# Patient Record
Sex: MALE | Race: WHITE | NOT HISPANIC OR LATINO | Employment: FULL TIME | URBAN - METROPOLITAN AREA
[De-identification: names, ages, dates, MRNs, and addresses within clinical notes are randomized per-mention and may not be internally consistent; named-entity substitution may affect disease eponyms.]

---

## 2020-09-03 ENCOUNTER — TREATMENT (OUTPATIENT)
Dept: PHYSICAL THERAPY | Facility: CLINIC | Age: 60
End: 2020-09-03

## 2020-09-03 DIAGNOSIS — Z74.09 IMPAIRED MOBILITY AND ADLS: Primary | ICD-10-CM

## 2020-09-03 DIAGNOSIS — R27.8 DECREASED COORDINATION: ICD-10-CM

## 2020-09-03 DIAGNOSIS — Z78.9 IMPAIRED MOBILITY AND ADLS: Primary | ICD-10-CM

## 2020-09-03 PROCEDURE — 97530 THERAPEUTIC ACTIVITIES: CPT | Performed by: OCCUPATIONAL THERAPIST

## 2020-09-03 PROCEDURE — 97166 OT EVAL MOD COMPLEX 45 MIN: CPT | Performed by: OCCUPATIONAL THERAPIST

## 2020-09-03 NOTE — PROGRESS NOTES
Occupational Therapy Initial Evaluation and Plan of Care      Patient: Maury Mei   : 1960  Diagnosis/ICD-10 Code:  Impaired mobility and ADLs [Z74.09, Z78.9]  Referring practitioner: No Known Provider  Date of Initial Visit: Type: THERAPY  Noted: 9/3/2020  Today's Date: 2020  Patient seen for 1 sessions      Subjective:     Subjective Questionnaire: 9HPT R: 25.75 L: 30.76  PURDUE PEGBOARD R: 13 L: 10 ROW: 8 ASSEMBLY: 21  Pt states performing washer with left hand was the most difficult item to manipulate. Pt demonstrates decreased translation and very deliberate movement L hand.       Subjective Evaluation    History of Present Illness  Mechanism of injury: Pt was at home, went into his office and experienced a sudden onset of inability to read emails, inability to speak, & L sided facial drooping. Pt also with L sided numbness and tingling. Wife took pt to [facility] and was then transferred to . At  pt received tPA and determined officially to have had a CVA. Pt was admitted on the  and was relased on the 26th of Aug to home with recommendations for OP therapy.   Pt reports that he falls to L when tying shoelaces along with when ambulating, he veers to left.  Pt has returned to cooking, mowing the lawn and cleaning with minimal difficulty. Pt states he has increased difficulty with concentration during reading but feels this is due to needing eyes examined.   When standing in the shower, pt experiences LOB, dick when closing eyes. He has noted significant increased fatigue and reports not feeling safe when performing stairs.      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    Hand dominance: right    Patient  Goals  Patient goals for therapy: return to sport/leisure activities, return to work, increased motion and improved balance  Patient goal: increase FMC speed and accuracy          Objective          Active Range of Motion   Left Shoulder   Flexion: WFL  Abduction: WFL    Right Shoulder   Flexion: WFL  Abduction: WFL    Left Elbow   Flexion: WFL  Extension: WFL  Forearm supination: WFL  Forearm pronation: WFL    Right Elbow   Flexion: WFL  Extension: WFL  Forearm supination: WFL  Forearm pronation: WFL    Strength/Myotome Testing     Left Shoulder     Planes of Motion   Flexion: 5   Abduction: 5     Right Shoulder     Planes of Motion   Flexion: 5   Abduction: 5     Left Elbow   Flexion: 5  Extension: 5    Right Elbow   Flexion: 5  Extension: 5    Left Wrist/Hand   Wrist extension: 5  Wrist flexion: 5     (2nd hand position)     Trial 1: 85 lbs    Trial 2: 85 lbs    Trial 3: 85 lbs    Average: 85 lbs    Thumb Strength  Key/Lateral Pinch     Trial 1: 20 lbs    Trial 2: 22 lbs    Trial 3: 24 lbs    Average: 22 lbs    Right Wrist/Hand   Wrist extension: 5  Wrist flexion: 5     (2nd hand position)     Trial 1: 90 lbs    Trial 2: 90 lbs    Trial 3: 85 lbs    Average: 88.33 lbs    Thumb Strength   Key/Lateral Pinch     Trial 1: 26 lbs    Trial 2: 22 lbs    Trial 3: 24 lbs    Average: 24 lbs    Additional Strength Details  Opposition slightly off L hand vs R  Decreased finger to nose, eyes closed with L targeting         OT Neuro         OT Exercises     Row Name 09/03/20 1300             Precautions    Existing Precautions/Restrictions  fall  -ST         Exercise 1    Exercise Name 1  OT IE completed per consult   -ST         Exercise 2    Exercise Name 2  issued green resistance putty, issued written HEP w/focus on pincer and lateral grasps  -ST         Exercise 3    Exercise Name 3  FMC w/focus on translation and in-hand manipulation L hand using nut/bolt  -ST        User Key  (r) = Recorded By, (t) = Taken By,  (c) = Cosigned By    Initials Name Provider Type     Margie Velvet J, OTR Occupational Therapist           Assessment & Plan     Assessment  Impairments: abnormal coordination, activity intolerance, impaired balance, impaired physical strength and lacks appropriate home exercise program  Assessment details: Pt presents with deficits related to acute CVA affecting L coordination and balance. Pt demonstrates mild GM and FMC impairments, particularly w/translation and in-hand manipulation. Recommend skilled OT services to address above areas to improve safety, independence and engagement in daily tasks.   Prognosis: fair  Functional Limitations: carrying objects, lifting, walking, pulling, pushing, standing, stooping, reaching overhead and unable to perform repetitive tasks  Goals  Plan Goals:   Pt will score 26 seconds or less L HAND on the 9HPT to demonstrate improved accuracy and speed with fine motor coordination by 8 wks.        Pt will complete dynamic standing activity X 10 mins with independent support while incorporating BUE's to simulate ADL/IADL tasks by 8 wks.     Pt will be independent with L hand strengthening HEP to increase independence with ADL/IADL performance tasks by 4 wks.    Pt will be independent with L hand FMC HEP to increase independence with ADL/IADL performance tasks by 4 wks.         Plan  Planned therapy interventions: ADL retraining, balance/weight-bearing training, fine motor coordination training, flexibility, functional ROM exercises, home exercise program, IADL retraining, motor coordination training, neuromuscular re-education, postural training, strengthening, stretching, therapeutic activities and transfer training  Frequency: 1x week  Duration in weeks: 8  Plan details: Est. OT POC and goals to reflect above deficits and promote increased independence, safety and engagement in daily tasks.         Timed:  Manual Therapy:    0     mins  88311;  Therapeutic Exercise:    0      mins  32262;     Neuromuscular Diomedes:    0    mins  69880;    Therapeutic Activity:     12     mins  12139;     Self-Care/ADL     0     mins  18860;   Sensory Int. Tech      0     mins 14387;  Ultrasound:     0     mins  03551;    Electrical Stimulation:    0     mins  99022 ( );    Untimed:  Electrical Stimulation:    0     mins  27767 ( );    Timed Treatment:   12   mins   Total Treatment:     45   mins    OT Signature: Velvet Hanson MS, OTR/L, CDP  [place] License #: [id number]  DATE TREATMENT INITIATED: 9/4/2020    Initial Certification Certification Period: 12/3/2020  I certify that the therapy services are furnished while this patient is under my care. The services outlined above are required by this patient and will be reviewed every 90 days.     Physician Signature: __________________________________  Provider, No Known    Please sign and return via fax to [phone]  Thank you,   [facility] Occupational Therapy

## 2020-09-09 ENCOUNTER — TREATMENT (OUTPATIENT)
Dept: PHYSICAL THERAPY | Facility: CLINIC | Age: 60
End: 2020-09-09

## 2020-09-09 DIAGNOSIS — Z74.09 IMPAIRED FUNCTIONAL MOBILITY, BALANCE, GAIT, AND ENDURANCE: Primary | ICD-10-CM

## 2020-09-09 DIAGNOSIS — I63.9 CEREBROVASCULAR ACCIDENT (CVA), UNSPECIFIED MECHANISM (HCC): ICD-10-CM

## 2020-09-09 PROCEDURE — 97162 PT EVAL MOD COMPLEX 30 MIN: CPT | Performed by: PHYSICAL THERAPIST

## 2020-09-09 NOTE — PROGRESS NOTES
Physical Therapy Initial Evaluation and Plan of Care      Patient: Maury Mei   : 1960  Diagnosis/ICD-10 Code:  Impaired functional mobility, balance, gait, and endurance [Z74.09]  Referring practitioner: No Known Provider  Date of Initial Visit: 2020  Today's Date: 2020  Patient seen for 1 sessions      Subjective:     Subjective Questionnaire: FGA   TUG 9.63 sec   10 Meter 10.06sec   miniBESTest       Subjective Evaluation    History of Present Illness  Date of onset: 2020  Mechanism of injury: Pt was at home, went into his office and experienced a sudden onset of inability to read emails, inability to speak, & L sided facial drooping. Pt also with L sided numbness and tingling. Wife took pt to [facility] and was then transferred to . At  pt received tPA and determined officially to have had a CVA. Pt was admitted on the  and was relased on the 26th of Aug to home with recommendations for OP therapy.   Pt reports that he falls to L when tying shoelaces along with when ambulating, he veers to left. 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 He is on FMLA currently.   Pt has returned to cooking, mowing the lawn and cleaning with minimal difficulty. Pt states he has increased difficulty with concentration during reading but feels this is due to needing eyes examined.   When standing in the shower, pt experiences LOB, dick when closing eyes. He has noted significant increased fatigue and reports not feeling safe when performing stairs.   Dr. Crow caro/  in [place]  [facility] will be at        Patient Occupation: Currently on FMLA - [employer]  Quality of life: good    Pain  No pain reported    Social Support  Lives in: multiple-level home  Lives with: spouse             Objective          Strength/Myotome Testing     Left Hip   Planes of Motion   Flexion: 4+  Extension: 3-  Abduction: 4-    Right Hip   Planes of Motion   Flexion: 5  Extension:  4  Abduction: 4+    Left Knee   Flexion: 4+  Extension: 4+    Right Knee   Flexion: 5  Extension: 5    Left Ankle/Foot   Dorsiflexion: 5    Right Ankle/Foot   Dorsiflexion: 5    Ambulation     Observational Gait     Additional Observational Gait Details  Normalized gait, slowed L arm swing         PT Neuro         Assessment & Plan     Assessment  Impairments: abnormal coordination, abnormal gait, activity intolerance, impaired balance, impaired physical strength and safety issue  Assessment details: Patient is a 60 YOM that presents with balance, strength and coordination deficits following a CVA. He has strength loss on the L that is impacting his balance in sitting and standing. He is currently off of work and plans to return to work in about 2 months. Patient will require skilled PT intervention to address deficits in order to improve function and mobility.   Prognosis: fair  Functional Limitations: carrying objects, walking, standing and stooping  Goals  Plan Goals: STG (6 visits)  1. Patient will report compliance with initial HEP.   2. Patient to perform TUG within 9 sec without LOB for improved functional mobility.  3. Patient to ambulate 10 meters without AD within 9 sec without LOB for improved       gait renard and functional mobility.  4. Patient to improve FGA score to >/= 27 /30 to decrease client's risk of falls.  5. Patient to improve mini-BEST test balance score to >/= 25/28 to decrease risk of falls.    LTG (12 visits)  1. Patient will be I with final HEP.   2. Patient to perform TUG within 8 sec without LOB for improved functional mobility.  3. Patient to ambulate 10 meters without AD within 8 sec without LOB for improved gait renard and functional mobility.  4. Patient to improve FGA score to >/= 30/30 to decrease client's risk of falls.  5. Patient to improve mini-BEST test balance score to >/= 28/28 to decrease client's risk of falls.        Plan  Therapy options: will be seen for skilled  physical therapy services  Planned modality interventions: TENS  Planned therapy interventions: ADL retraining, balance/weight-bearing training, flexibility, gait training, manual therapy, neuromuscular re-education, motor coordination training, postural training, strengthening, stretching, therapeutic activities, transfer training and home exercise program  Frequency: 1x week  Duration in visits: 6  Treatment plan discussed with: patient and caregiver  Plan details: Patient will be seen 1x/wk x 6wks with treatment to include strengthening, stretching, manual therapy, neuromuscular re-education, balance, gait and endurance training.           Timed:  Manual Therapy:    0     mins  78481;  Therapeutic Exercise:    0     mins  31365;     Neuromuscular Diomedes:    0    mins  13436;    Therapeutic Activity:     0     mins  15391;     Gait Trainin     mins  35017;     Electrical Stimulation:    0     mins  85937 ( );    Untimed:  Canalith Repositioning    0     mins 64779    Timed Treatment:   0   mins   Total Treatment:     45   mins    PT SIGNATURE: Pauly Huff PT, DPT, MSCS, CDP  [place] License #[id number]  DATE TREATMENT INITIATED: 2020    Initial Certification Certification Period: 2020  I certify that the therapy services are furnished while this patient is under my care.  The services outlined above are required by this patient, and will be reviewed every 90 days.     PHYSICIAN: Provider, No Known      DATE:     Please sign and return via fax to [phone].   Thank you,   [facility] Physical Therapy.

## 2020-09-10 ENCOUNTER — TREATMENT (OUTPATIENT)
Dept: PHYSICAL THERAPY | Facility: CLINIC | Age: 60
End: 2020-09-10

## 2020-09-10 DIAGNOSIS — Z74.09 IMPAIRED MOBILITY AND ADLS: Primary | ICD-10-CM

## 2020-09-10 DIAGNOSIS — R27.8 DECREASED COORDINATION: ICD-10-CM

## 2020-09-10 DIAGNOSIS — Z78.9 IMPAIRED MOBILITY AND ADLS: Primary | ICD-10-CM

## 2020-09-10 PROCEDURE — 97530 THERAPEUTIC ACTIVITIES: CPT | Performed by: OCCUPATIONAL THERAPIST

## 2020-09-10 PROCEDURE — 97112 NEUROMUSCULAR REEDUCATION: CPT | Performed by: OCCUPATIONAL THERAPIST

## 2020-09-10 NOTE — PROGRESS NOTES
"Occupational Therapy Daily Progress Note  Visit: 2  Date of Initial Visit: Type: THERAPY  Noted: 9/3/2020      Patient: Maury Mei   : 1960  Diagnosis/ICD-10 Code:  Impaired mobility and ADLs [Z74.09, Z78.9]  Referring practitioner: No Known Provider  Date of Initial Visit: Type: THERAPY  Noted: 9/3/2020  Today's Date: 9/10/2020  Patient seen for 2 sessions      Subjective:   Patient reports: \"I think my hand is getting better\"  Pain: 0/10  Subjective Questionnaire: N/A  Clinical Progress: improved  Home Program Compliance: Yes  Treatment has included: neuromuscular re-education and therapeutic activity    Subjective   Objective     OT Neuro         OT Exercises     Row Name 09/10/20 0845             Exercise 1    Exercise Name 1  digit strengthening  -ST      Equipment 1  Hand Gripper  -ST      Resistance 1  Blue;Black  -ST      Sets 1  4  -ST      Reps 1  10  -ST         Exercise 2    Exercise Name 2  translation and and pincer grasp focus using coins to retrieve and place on table top L hand, increasing speed for difficulty  -ST         Exercise 3    Exercise Name 3  FMC with translation/in-hand manipulation focus using flexi-puzzle  -ST         Exercise 4    Exercise Name 4  extensor strengthening entire hand/individual digits using rubber band resistance; see HEPs for details  -ST      Sets 4  2  -ST      Reps 4  10  -ST         Exercise 5    Exercise Name 5  L handed buttoning for improving speed, accuracy and dexterity   -ST         Exercise 6    Exercise Name 6  strengthening and coordination using L hand w/olive grabber to retreive varying sized/shaped objects, isolating individual digits   -ST         Exercise 7    Exercise Name 7  dyanmic tripod grasp L hand with tweezer prehension   -ST        User Key  (r) = Recorded By, (t) = Taken By, (c) = Cosigned By    Initials Name Provider Type    Velvet Velasco, OTR Occupational Therapist           Assessment & Plan     Assessment  Assessment " details: Pt demonstrates good progression with all L handed coordination tasks though fatigues quickly, dick with digits II and III w/any sustained/repetitive tasks. Pt with better in-hand manipulation vs translation. Pt is very motivated and compliant with HEPs. Added additional HEPs for home to progress complexity and difficulty of coordination/dexterity tasks to aid with return to work and PLOF.    Plan  Plan details: Continue w/POC and goals as est with progression of complexity to aid with return to PLOF.        Visit Diagnoses:    ICD-10-CM ICD-9-CM   1. Impaired mobility and ADLs Z74.09 V49.89    Z78.9    2. Decreased coordination R27.9 781.3             Timed:  Manual Therapy:    0     mins  93190;  Therapeutic Exercise:    0     mins  19898;     Neuromuscular Diomedes:    23    mins  47161;    Therapeutic Activity:     19     mins  67231;     Self-Care/ADL     0     mins  33041;   Sensory Int. Tech      0     mins 83838;  Ultrasound:     0     mins  89115;    Electrical Stimulation:    0     mins  40566 ( );    Untimed:  Electrical Stimulation:    0     mins  80261 ( );    Timed Treatment:   42   mins   Total Treatment:     42   mins    OT Signature: Velvet Hanson MS, OTR/L, CDP  [place] License #: [id number]

## 2020-09-14 ENCOUNTER — TREATMENT (OUTPATIENT)
Dept: PHYSICAL THERAPY | Facility: CLINIC | Age: 60
End: 2020-09-14

## 2020-09-14 DIAGNOSIS — Z74.09 IMPAIRED FUNCTIONAL MOBILITY, BALANCE, GAIT, AND ENDURANCE: Primary | ICD-10-CM

## 2020-09-14 DIAGNOSIS — I63.9 CEREBROVASCULAR ACCIDENT (CVA), UNSPECIFIED MECHANISM (HCC): ICD-10-CM

## 2020-09-14 PROCEDURE — 97112 NEUROMUSCULAR REEDUCATION: CPT | Performed by: PHYSICAL THERAPIST

## 2020-09-14 PROCEDURE — 97110 THERAPEUTIC EXERCISES: CPT | Performed by: PHYSICAL THERAPIST

## 2020-09-15 NOTE — PROGRESS NOTES
Physical Therapy Daily Progress Note  Visit: 2  Date of Initial Visit: Type: THERAPY  Noted: 2020    Patient: Maury Mei   : 1960  Diagnosis/ICD-10 Code:  Impaired functional mobility, balance, gait, and endurance [Z74.09]  Referring practitioner: No Known Provider  Date of Initial Visit: Type: THERAPY  Noted: 2020  Today's Date: 9/15/2020  Patient seen for 2 sessions      Subjective:   Patient reports: he cannot do 2 things at once.   Pain: 0/10  Clinical Progress: improved  Home Program Compliance: Yes  Treatment has included: therapeutic exercise and neuromuscular re-education    Objective   See Exercise, Manual, and Modality Logs for complete treatment.    PT Neuro  Exercise 1  Exercise Name 1: Nu-Step   Equipment/Resistance 1: L8  Time: 8 min  Exercise 2  Exercise Name 2: BOSU lunging   Sets/Reps 2: 20  Exercise 3  Exercise Name 3: BOSU standing with lateral, forward and backward stepping off of ball   Sets/Reps 3: 20 ea   Exercise 4  Exercise Name 4: BOSU airex balance beam walking - lateral, tandem and backwards   Exercise 5  Exercise Name 5: stair training forward and backwards     Assessment & Plan     Assessment  Assessment details: Patient demonstrates good balance on all unstable surfaces. He did report feeling a difference between R and L LE's with L being the worst. Patient will continue to be progressed with his HEP and exercises in clinic as indicated.     Plan  Plan details: Patient to continue with PT services to improve gait, balance, strength, transfers and overall functional mobility.          Timed:  Manual Therapy:            0     mins  43636;  Therapeutic Exercise:    10    mins  13423;     Neuromuscular Diomedes:    30    mins  94442;    Therapeutic Activity:      0     mins  75299;     Gait Trainin    mins  70139;     Electrical Stimulation:    0    mins  64529 ( );     Untimed:  Canalith Repositioning techniques _0_ 80091      Timed Treatment:    40   mins   Total Treatment:     40   mins      Pauly Huff PT, DPT, MSCS, CDP  [place] License #: [id number]  Physical Therapist

## 2020-09-21 ENCOUNTER — TREATMENT (OUTPATIENT)
Dept: PHYSICAL THERAPY | Facility: CLINIC | Age: 60
End: 2020-09-21

## 2020-09-21 DIAGNOSIS — I63.9 CEREBROVASCULAR ACCIDENT (CVA), UNSPECIFIED MECHANISM (HCC): ICD-10-CM

## 2020-09-21 DIAGNOSIS — Z74.09 IMPAIRED FUNCTIONAL MOBILITY, BALANCE, GAIT, AND ENDURANCE: Primary | ICD-10-CM

## 2020-09-21 PROCEDURE — 97112 NEUROMUSCULAR REEDUCATION: CPT | Performed by: PHYSICAL THERAPIST

## 2020-09-21 PROCEDURE — 97110 THERAPEUTIC EXERCISES: CPT | Performed by: PHYSICAL THERAPIST

## 2020-09-22 ENCOUNTER — TREATMENT (OUTPATIENT)
Dept: PHYSICAL THERAPY | Facility: CLINIC | Age: 60
End: 2020-09-22

## 2020-09-22 DIAGNOSIS — R27.8 DECREASED COORDINATION: ICD-10-CM

## 2020-09-22 DIAGNOSIS — Z78.9 IMPAIRED MOBILITY AND ADLS: Primary | ICD-10-CM

## 2020-09-22 DIAGNOSIS — Z74.09 IMPAIRED MOBILITY AND ADLS: Primary | ICD-10-CM

## 2020-09-22 PROCEDURE — 97530 THERAPEUTIC ACTIVITIES: CPT | Performed by: OCCUPATIONAL THERAPIST

## 2020-09-22 PROCEDURE — 97110 THERAPEUTIC EXERCISES: CPT | Performed by: OCCUPATIONAL THERAPIST

## 2020-09-22 NOTE — PROGRESS NOTES
Physical Therapy Daily Progress Note  Visit: 3  Date of Initial Visit: Type: THERAPY  Noted: 2020    Patient: Maury Mei   : 1960  Diagnosis/ICD-10 Code:  Impaired functional mobility, balance, gait, and endurance [Z74.09]  Referring practitioner: No Known Provider  Date of Initial Visit: Type: THERAPY  Noted: 2020  Today's Date: 2020  Patient seen for 3 sessions      Subjective:   Patient reports: he is doing better.   Pain: 0/10  Clinical Progress: improved  Home Program Compliance: Yes  Treatment has included: therapeutic exercise and neuromuscular re-education    Objective   See Exercise, Manual, and Modality Logs for complete treatment.    PT Neuro   Exercise 1  Exercise Name 1: Nu-Step   Equipment/Resistance 1: L8  Time: 8 min  Exercise 2  Exercise Name 2: BOSU step backs   Sets/Reps 2: 20  Exercise 3  Exercise Name 3: airex SLS with rebounder toss   Sets/Reps 3: 10  Exercise 4  Exercise Name 4: lateral SLS toss   Sets/Reps 4: 20  Exercise 5  Exercise Name 5: rebounder outward and switch kick jumps   Sets/Reps 5: 10 ea   Exercise 6  Exercise Name 6: SLS tennis ball toss to wall   Sets/Reps 6: 20   Exercise 7  Exercise Name 7: SLS taps to wall   Sets/Reps 7: 5 rounds   Exercise 8  Exercise Name 8: jumping over stable ground   Time 8: 2 min   Exercise 9  Exercise Name 9: backwards lunges   Sets/Reps 9: 5    Assessment & Plan     Assessment  Assessment details: Patient demonstrates excellent dynamic and static balance with dual tasking. He is improving with BLE strength and had no complaints during exercise. HEP will be progressed.     Plan  Plan details: Patient to continue with PT services to improve gait, balance, strength, transfers and overall functional mobility.          Timed:  Manual Therapy:            0     mins  09103;  Therapeutic Exercise:    20    mins  66840;     Neuromuscular Diomedes:    25    mins  27658;    Therapeutic Activity:      0     mins  09010;     Gait Training:                  0    mins  37163;     Electrical Stimulation:    0    mins  94429 ( );     Untimed:  Canalith Repositioning techniques _0_ 75321      Timed Treatment:   45   mins   Total Treatment:     45   mins      Pauly Huff PT, REAT, MSCS, CDP  [place] License #: [id number]  Physical Therapist

## 2020-09-22 NOTE — PROGRESS NOTES
"Occupational Therapy Daily Progress Note  Visit: 3  Date of Initial Visit: Type: THERAPY  Noted: 9/3/2020      Patient: Maury Mei   : 1960  Diagnosis/ICD-10 Code:  Impaired mobility and ADLs [Z74.09, Z78.9]  Referring practitioner: No Known Provider  Date of Initial Visit: Type: THERAPY  Noted: 9/3/2020  Today's Date: 2020  Patient seen for 3 sessions      Subjective:   Patient reports: \"I've been working on yard work and even cooking pretty much every day now. The big thing that gets me is the fatigue level\"  Pain: 0/10  Subjective Questionnaire: n/a  Clinical Progress: improved  Home Program Compliance: Yes  Treatment has included: therapeutic exercise and therapeutic activity    Subjective   Objective     OT Neuro         OT Exercises     Row Name 20 1300             Exercise 1    Exercise Name 1  neural priming with UE strengthening, NuStep, UE's only, L7  -ST      Time (Minutes) 1  4  -ST         Exercise 2    Exercise Name 2  digit strengthening  -ST      Equipment 2  Hand Gripper  -ST      Resistance 2  Blue;Black  -ST      Sets 2  2  -ST      Reps 2  10  -ST         Exercise 3    Exercise Name 3  finger tip shoulder f/e and shoulder abd/adduction using 4# med ball finger tips to maintain balance and stability of ball   -ST      Sets 3  2  -ST      Reps 3  10  -ST         Exercise 4    Exercise Name 4  bicep curls and OH press using 4# med ball   -ST      Sets 4  2  -ST      Reps 4  10  -ST         Exercise 5    Exercise Name 5  5# therabar OH press/CP, wrist f/e  -ST      Sets 5  2  -ST      Reps 5  10  -ST         Exercise 6    Exercise Name 6  forearm pronation/supination, wrist f/e using blue flexi-bar  -ST      Sets 6  2  -ST      Reps 6  10  -ST         Exercise 7    Exercise Name 7  FMC with focus on speed, accuracy and control of B hands and individual hands w/shuffling cards  -ST         Exercise 8    Exercise Name 8  translation/in-hand manipulation L hand using coins for " pincer grasp   -ST        User Key  (r) = Recorded By, (t) = Taken By, (c) = Cosigned By    Initials Name Provider Type    Velvet Velasco OTR Occupational Therapist           Assessment & Plan     Assessment  Assessment details: Pt improving with FMC speed and accuracy L hand but continues to experience fatigue and weakness with LUE during any sustained/repetivie motions. Pt notes weakness mostly in bicep and deltoid regions. Pt and wife educated at length on taking adequate rest breaks at home and spreading out activities to build endurance where lost from CVA.     Plan  Plan details: Continue w/OT POC and goals as previously est.         Visit Diagnoses:    ICD-10-CM ICD-9-CM   1. Impaired mobility and ADLs  Z74.09 V49.89    Z78.9    2. Decreased coordination  R27.9 781.3             Timed:  Manual Therapy:    0     mins  28679;  Therapeutic Exercise:    20     mins  91279;     Neuromuscular Diomedes:    0    mins  01844;    Therapeutic Activity:     25     mins  53576;     Self-Care/ADL     0     mins  54818;   Sensory Int. Tech      0     mins 28918;  Ultrasound:     0     mins  55698;    Electrical Stimulation:    0     mins  55056 ( );    Untimed:  Electrical Stimulation:    0     mins  30892 ( );    Timed Treatment:   45   mins   Total Treatment:     45   mins    OT Signature: Velvet Hanson MS, OTR/L, LYLY  [place] License #: [id number]

## 2020-09-23 ENCOUNTER — TRANSCRIBE ORDERS (OUTPATIENT)
Dept: PHYSICAL THERAPY | Facility: CLINIC | Age: 60
End: 2020-09-23

## 2020-09-29 ENCOUNTER — TREATMENT (OUTPATIENT)
Dept: PHYSICAL THERAPY | Facility: CLINIC | Age: 60
End: 2020-09-29

## 2020-09-29 DIAGNOSIS — Z74.09 IMPAIRED MOBILITY AND ADLS: Primary | ICD-10-CM

## 2020-09-29 DIAGNOSIS — R27.8 DECREASED COORDINATION: ICD-10-CM

## 2020-09-29 DIAGNOSIS — Z78.9 IMPAIRED MOBILITY AND ADLS: Primary | ICD-10-CM

## 2020-09-29 PROCEDURE — 97110 THERAPEUTIC EXERCISES: CPT | Performed by: OCCUPATIONAL THERAPIST

## 2020-09-29 PROCEDURE — 97112 NEUROMUSCULAR REEDUCATION: CPT | Performed by: OCCUPATIONAL THERAPIST

## 2020-09-29 PROCEDURE — 97530 THERAPEUTIC ACTIVITIES: CPT | Performed by: OCCUPATIONAL THERAPIST

## 2020-09-29 NOTE — PROGRESS NOTES
"Occupational Therapy Daily Progress Note  Visit: 4  Date of Initial Visit: Type: THERAPY  Noted: 9/3/2020      Patient: Maury Mei   : 1960  Diagnosis/ICD-10 Code:  Impaired mobility and ADLs [Z74.09, Z78.9]  Referring practitioner: No Known Provider  Date of Initial Visit: Type: THERAPY  Noted: 9/3/2020  Today's Date: 2020  Patient seen for 4 sessions      Subjective:   Patient reports: \"I'm working on building my deck. It's a lot harder than I thought. I just don'w have the stamina I used to have. Stirring the concrete is the worst part\"  Pain: 0/10  Subjective Questionnaire: n/a  Clinical Progress: improved  Home Program Compliance: Yes  Treatment has included: therapeutic exercise, neuromuscular re-education and therapeutic activity    Subjective   Objective     OT Neuro              Assessment & Plan     Assessment  Assessment details: Pt fatiguing quickly with lunging ALT R/L LE's onto Bosu with OH strengthening activities s/p use of NuStep. Pt improving with reaction time and WS'ing with cuing during standing on Bosu and 1/2 foam rolls. Pt continues to be slower and require significantly more time and effort w/high-level L handed dexterity tasks.         Visit Diagnoses:    ICD-10-CM ICD-9-CM   1. Impaired mobility and ADLs  Z74.09 V49.89    Z78.9    2. Decreased coordination  R27.9 781.3       SEE ACTIVITY LOG AND EXERCISE FLOW SHEET FOR DETAILS        Timed:  Manual Therapy:    0     mins  67520;  Therapeutic Exercise:    15     mins  33552;     Neuromuscular Diomedes:    15    mins  33002;    Therapeutic Activity:     15     mins  80053;     Self-Care/ADL     0     mins  02949;   Sensory Int. Tech      0     mins 01230;  Ultrasound:     0     mins  19385;    Electrical Stimulation:    0     mins  61512 ( );    Untimed:  Electrical Stimulation:    0     mins  90039 ( );    Timed Treatment:   45   mins   Total Treatment:     45   mins    OT Signature: Velvet Hanson MS, OTR/L, " LYLY  [place] License #: [id number]

## 2020-10-05 ENCOUNTER — TREATMENT (OUTPATIENT)
Dept: PHYSICAL THERAPY | Facility: CLINIC | Age: 60
End: 2020-10-05

## 2020-10-05 DIAGNOSIS — Z74.09 IMPAIRED FUNCTIONAL MOBILITY, BALANCE, GAIT, AND ENDURANCE: Primary | ICD-10-CM

## 2020-10-05 DIAGNOSIS — I63.9 CEREBROVASCULAR ACCIDENT (CVA), UNSPECIFIED MECHANISM (HCC): ICD-10-CM

## 2020-10-05 PROCEDURE — 97110 THERAPEUTIC EXERCISES: CPT | Performed by: PHYSICAL THERAPIST

## 2020-10-05 PROCEDURE — 97112 NEUROMUSCULAR REEDUCATION: CPT | Performed by: PHYSICAL THERAPIST

## 2020-10-06 ENCOUNTER — TREATMENT (OUTPATIENT)
Dept: PHYSICAL THERAPY | Facility: CLINIC | Age: 60
End: 2020-10-06

## 2020-10-06 DIAGNOSIS — Z74.09 IMPAIRED MOBILITY AND ADLS: Primary | ICD-10-CM

## 2020-10-06 DIAGNOSIS — Z78.9 IMPAIRED MOBILITY AND ADLS: Primary | ICD-10-CM

## 2020-10-06 DIAGNOSIS — R27.8 DECREASED COORDINATION: ICD-10-CM

## 2020-10-06 PROCEDURE — 97110 THERAPEUTIC EXERCISES: CPT | Performed by: OCCUPATIONAL THERAPIST

## 2020-10-06 PROCEDURE — 97112 NEUROMUSCULAR REEDUCATION: CPT | Performed by: OCCUPATIONAL THERAPIST

## 2020-10-06 NOTE — PROGRESS NOTES
Physical Therapy Daily Progress Note  Visit: 4  Date of Initial Visit: Type: THERAPY  Noted: 2020    Patient: Maury Mei   : 1960  Diagnosis/ICD-10 Code:  Impaired functional mobility, balance, gait, and endurance [Z74.09]  Referring practitioner: No Known Provider  Date of Initial Visit: Type: THERAPY  Noted: 2020  Today's Date: 10/6/2020  Patient seen for 4 sessions      Subjective:   Patient reports: he is quitting nicotine and caffeine all at once.    Pain: 0/10  Clinical Progress: improved  Home Program Compliance: Yes  Treatment has included: therapeutic exercise and neuromuscular re-education    Objective   See Exercise, Manual, and Modality Logs for complete treatment.    PT Neuro   Exercise 1  Exercise Name 1: Elliptical   Equipment/Resistance 1: L2  Time: 6 min   Exercise 2  Exercise Name 2: kneeling with UE assist   Sets/Reps 2: 10 ea   Exercise 3  Exercise Name 3: QP birddogs   Sets/Reps 3: 10 ea   Exercise 4  Exercise Name 4: QP donkey kicks   Sets/Reps 4: 10 ea   Exercise 5  Exercise Name 5: TG SLS   Sets/Reps 5: 15 ea   Exercise 6  Exercise Name 6: TG hops   Sets/Reps 6: 2/20   Exercise 7  Exercise Name 7: HK knee lifts   Sets/Reps 7: 10 ea   Exercise 8  Exercise Name 8: TK to HK   Sets/Reps 8: 20    Assessment & Plan     Assessment  Assessment details: Patient with increased weakness post elliptical. B quads were buckling. He required a 2 min rest break in order to continue with exercise. Donkey kicks were added to HEP. Overall he tolerated the treatment well with noted more fatigue.     Plan  Plan details: Patient to continue with PT services to improve gait, balance, strength, transfers and overall functional mobility.          Timed:  Manual Therapy:            0     mins  14299;  Therapeutic Exercise:    10    mins  77027;     Neuromuscular Diomedes:    30    mins  45586;    Therapeutic Activity:      0     mins  21545;     Gait Trainin    mins  07798;      Electrical Stimulation:    0    mins  99165 ( );     Untimed:  Canalith Repositioning techniques _0_ [id number]      Timed Treatment:   40   mins   Total Treatment:     40   mins      Pauly Huff PT, REAT, MSCS, CDP  [place] License #: [id number]  Physical Therapist

## 2020-10-06 NOTE — PROGRESS NOTES
"Occupational Therapy Daily Progress Note  Visit: 5  Date of Initial Visit: Type: THERAPY  Noted: 9/3/2020      Patient: Maury Mei   : 1960  Diagnosis/ICD-10 Code:  Impaired mobility and ADLs [Z74.09, Z78.9]  Referring practitioner: No Known Provider  Date of Initial Visit: Type: THERAPY  Noted: 9/3/2020  Today's Date: 10/6/2020  Patient seen for 5 sessions      Subjective:   Patient reports: \"I'm really actually sore from PT yesterday\"  Pain: 0/10  Subjective Questionnaire: n/a  Clinical Progress: improved  Home Program Compliance: Yes  Treatment has included: therapeutic exercise and neuromuscular re-education    Subjective   Objective     OT Neuro         OT Exercises     Row Name 10/06/20 1300             Exercise 1    Exercise Name 1  neural priming with UE/LE strengthening and activity stephan building, L 10  -ST      Time (Minutes) 1  8  -ST         Exercise 2    Exercise Name 2  ALT R/L BLE lunging on Bosu while performing elbow f/e using 4# med ball   -ST         Exercise 3    Exercise Name 3  lateral lunging onto Bosu ball; R/L LE's while performing trunk rotation R/L 4# med ball   -ST         Exercise 4    Exercise Name 4  stepping laterally R/L while performing CP   -ST      Equipment 4  Dowel  -ST      Weights/Plates 4  5  -ST         Exercise 5    Exercise Name 5  OH press/CP standing on Bosu  -ST      Equipment 5  Dowel  -ST      Weights/Plates 5  5  -ST         Exercise 6    Exercise Name 6  one knee tall kneeling/ALT R/L performing trunk rotation R/L lifting/placing 8# box  -ST         Exercise 7    Exercise Name 7  shoulder abd/adduction; OH press LUE  -ST      Cueing 7  Verbal  -ST      Equipment 7  Dumbell  -ST      Weights/Plates 7  5  -ST      Sets 7  2  -ST      Reps 7  10  -ST        User Key  (r) = Recorded By, (t) = Taken By, (c) = Cosigned By    Initials Name Provider Type    Velvet Velasco OTR Occupational Therapist           Assessment & Plan     Assessment  Assessment " details: Pt progressing with stamina and complexity of activities however still demonstrates weakness with OH and lateral UE movements as exhibited w/difficulty maintaining good elbow extension during varying exercises. Pt improving with dual tasks including balance/strengthening. Cuing for completion of correct plank position. Will continue to progress strength/balance/coordination for aid in return to PLOF.    Plan  Plan details: Continue w/OT POC and goals as previously est.         Visit Diagnoses:    ICD-10-CM ICD-9-CM   1. Impaired mobility and ADLs  Z74.09 V49.89    Z78.9    2. Decreased coordination  R27.8 781.3             Timed:  Manual Therapy:    0     mins  09444;  Therapeutic Exercise:    25     mins  85044;     Neuromuscular Diomedes:    20    mins  66248;    Therapeutic Activity:     0     mins  07488;     Self-Care/ADL     0     mins  64495;   Sensory Int. Tech      0     mins 46052;  Ultrasound:     0     mins  45351;    Electrical Stimulation:    0     mins  85274 ( );    Untimed:  Electrical Stimulation:    0     mins  14317 ( );    Timed Treatment:   45   mins   Total Treatment:     45   mins    OT Signature: Velvet Hanson MS, OTR/L, CDP  [place] License #: [id number]

## 2020-10-13 ENCOUNTER — TREATMENT (OUTPATIENT)
Dept: PHYSICAL THERAPY | Facility: CLINIC | Age: 60
End: 2020-10-13

## 2020-10-13 DIAGNOSIS — Z74.09 IMPAIRED MOBILITY AND ADLS: Primary | ICD-10-CM

## 2020-10-13 DIAGNOSIS — R27.8 DECREASED COORDINATION: ICD-10-CM

## 2020-10-13 DIAGNOSIS — Z78.9 IMPAIRED MOBILITY AND ADLS: Primary | ICD-10-CM

## 2020-10-13 PROCEDURE — 97110 THERAPEUTIC EXERCISES: CPT | Performed by: OCCUPATIONAL THERAPIST

## 2020-10-13 PROCEDURE — 97530 THERAPEUTIC ACTIVITIES: CPT | Performed by: OCCUPATIONAL THERAPIST

## 2020-10-14 ENCOUNTER — TREATMENT (OUTPATIENT)
Dept: PHYSICAL THERAPY | Facility: CLINIC | Age: 60
End: 2020-10-14

## 2020-10-14 DIAGNOSIS — Z74.09 IMPAIRED FUNCTIONAL MOBILITY, BALANCE, GAIT, AND ENDURANCE: Primary | ICD-10-CM

## 2020-10-14 DIAGNOSIS — I63.9 CEREBROVASCULAR ACCIDENT (CVA), UNSPECIFIED MECHANISM (HCC): ICD-10-CM

## 2020-10-14 PROCEDURE — 97110 THERAPEUTIC EXERCISES: CPT | Performed by: PHYSICAL THERAPIST

## 2020-10-14 PROCEDURE — 97112 NEUROMUSCULAR REEDUCATION: CPT | Performed by: PHYSICAL THERAPIST

## 2020-10-14 NOTE — PROGRESS NOTES
"OT Re-Assessment / Re-Certification        Patient: Maury Mei   : 1960  Diagnosis/ICD-10 Code:  Impaired mobility and ADLs [Z74.09, Z78.9]  Referring practitioner: No Known Provider  Date of Initial Visit: Type: THERAPY  Noted: 9/3/2020  Today's Date: 10/13/2020  Patient seen for 6 sessions      Subjective:   Patient reports: \"I think my balance, strength and coordination is really coming along. I even joined a gym near my house. The only thing that just really gets me is my endurance. It's just depressing. I tried walking to the bakery over the wkend and couldn't even get all the way there.\"  Pain: 0/10  Subjective Questionnaire: 9HPT R: 17.87 L: 20.51  PURDUE PEGBOARD R: 15 L; 14 ROW: 10 ASSEMBLY: 24  Pt demonstrates improved speed, accuracy and coordination with picking up, placing and manipulation of items in FMC tests  Clinical Progress: improved  Home Program Compliance: Yes  Treatment has included: therapeutic exercise and therapeutic activity    Subjective   Objective          Strength/Myotome Testing     Left Wrist/Hand      (2nd hand position)     Trial 1: 90 lbs    Trial 2: 91 lbs    Trial 3: 84 lbs    Average: 88.33 lbs    Thumb Strength  Key/Lateral Pinch     Trial 1: 21 lbs    Trial 2: 22 lbs    Trial 3: 25 lbs    Average: 22.67 lbs    Right Wrist/Hand      (2nd hand position)     Trial 1: 86 lbs    Trial 2: 87 lbs    Trial 3: 85 lbs    Average: 86 lbs    Thumb Strength   Key/Lateral Pinch     Trial 1: 25 lbs    Trial 2: 24 lbs    Trial 3: 24 lbs    Average: 24.33 lbs        OT Neuro        OT Exercises     Row Name 10/13/20 1300             Exercise 1    Exercise Name 1  neural priming with warm-up, strengthening and activity tolerance building; 4 mins L 10 NuStep, 2 mins elliptical L 2  -ST         Exercise 2    Exercise Name 2  OT re-assessment completed per POC  -ST         Exercise 3    Exercise Name 3  FMC task focus on translation and in-hand manipulation L hand w/eyes " closed to increase difficulty   -ST         Exercise 4    Exercise Name 4  4# med ball shoulder f/e, shoulder abd/adduction  -ST      Sets 4  2  -ST      Reps 4  12  -ST        User Key  (r) = Recorded By, (t) = Taken By, (c) = Cosigned By    Initials Name Provider Type    Velvet Velasco OTR Occupational Therapist          Assessment & Plan     Assessment  Impairments: abnormal coordination, activity intolerance, impaired balance, impaired physical strength and lacks appropriate home exercise program  Assessment details: OT re-assessment completed per POC. Pt met all existing OT goals and added specific UE strengthening HEP goal for carry over with return to work and other IADL tasks. Pt demonstrates great return with FMC and hand strength but continues to lack activity tolerance and generalized strength with will impact return to work. Recommend continued skilled OT services to aid with these areas and promote return to PLOF.     Prognosis: good  Functional Limitations: carrying objects, lifting, walking, pulling, pushing, standing, stooping, reaching overhead and unable to perform repetitive tasks  Goals  Plan Goals:   Pt will score 26 seconds or less L HAND on the 9HPT to demonstrate improved accuracy and speed with fine motor coordination by 8 wks. MET       Pt will complete dynamic standing activity X 10 mins with independent support while incorporating BUE's to simulate ADL/IADL tasks by 8 wks. MET     Pt will be independent with L hand strengthening HEP to increase independence with ADL/IADL performance tasks by 4 wks. MET    Pt will be independent with L hand FMC HEP to increase independence with ADL/IADL performance tasks by 4 wks. MET    Pt will be independent with LUE strengthening HEP to aid with return to work/IADL function by d/c. NEW GOAL ADDED 10/13/2020        Plan  Planned therapy interventions: ADL retraining, balance/weight-bearing training, fine motor coordination training, flexibility,  functional ROM exercises, home exercise program, IADL retraining, motor coordination training, neuromuscular re-education, postural training, strengthening, stretching, therapeutic activities and transfer training  Frequency: 1x week  Duration in weeks: 8  Plan details: Continue w/skilled OT POC and goals to reflect above deficits and promote increased independence, safety and engagement in daily tasks.         Visit Diagnoses:    ICD-10-CM ICD-9-CM   1. Impaired mobility and ADLs  Z74.09 V49.89    Z78.9    2. Decreased coordination  R27.8 781.3       Progress toward previous goals: Partially Met      Recommendations: Continue as planned  Timeframe: 1 month  Prognosis to achieve goals: good    OT Signature: Velvet Hanson MS, OTR/L, CDP  [place] License #: [id number]    Based upon review of the patient's progress and continued therapy plan, it is my medical opinion that Maury Mei should continue occupational therapy treatment at [facility]  [address] [place] [id number] [phone].    Signature: __________________________________  Provider, No Known    Timed:  Manual Therapy:    0     mins  69673;  Therapeutic Exercise:    16     mins  81817;     Neuromuscular Diomedes:    0    mins  38770;    Therapeutic Activity:     27     mins  84690;     Self-Care/ADL     0     mins  88277;   Sensory Int. Tech      0     mins 39561;  Ultrasound:     0     mins  05092;    Electrical Stimulation:    0     mins  94009 ( );    Untimed:  Electrical Stimulation:    0     mins  00291 ( );    Timed Treatment:   43   mins   Total Treatment:     43   mins

## 2020-10-15 NOTE — PROGRESS NOTES
PT Re-Assessment / Re-Certification  Visit: 5  Date of Initial Visit: Type: THERAPY  Noted: 2020    Patient: Maury Mei   : 1960  Diagnosis/ICD-10 Code:  Impaired functional mobility, balance, gait, and endurance [Z74.09]  Referring practitioner: No Known Provider  Date of Initial Visit: Type: THERAPY  Noted: 2020  Today's Date: 10/15/2020  Patient seen for 5 sessions      Subjective:   Patient reports: he is having a rough time since he is cutting caffiene and nicotine.   Pain: 0/10  Clinical Progress: improved  Home Program Compliance: Yes  Treatment has included: therapeutic exercise and neuromuscular re-education    Objective   See Exercise, Manual, and Modality Logs for complete treatment.    PT Neuro  Exercise 1  Exercise Name 1: Elliptical   Equipment/Resistance 1: L2  Time: 6 min   Exercise 2  Exercise Name 2: BOSU kneeling   Exercise 3  Exercise Name 3: BOSU sitting with feet off of floor - bringing knees up to chest.   Sets/Reps 3: 2/15  Exercise 4  Exercise Name 4: row  Time 4: 2 min - out of breath   Exercise 5  Exercise Name 5: Reassessment performed     Assessment & Plan     Assessment  Impairments: abnormal coordination, abnormal gait, activity intolerance, impaired balance, impaired physical strength and safety issue  Assessment details: Patient has made significant improvements since beginning skilled PT intervention, however patient has had a slight decline in the last 2 weeks due to cutting nicotine, as well as caffeine in order to improve diabetes and cut risk of another stroke. His endurance is the largest limiting factor to him returning to work or prior level of function. He will require continued skilled PT in order to address remaining deficits.   Prognosis: fair  Functional Limitations: carrying objects, walking, standing and stooping  Goals  Plan Goals: STG (6 visits)  1. Patient will report compliance with initial HEP. MET  2. Patient to perform TUG within 9 sec  without LOB for improved functional mobility.MET  3. Patient to ambulate 10 meters without AD within 9 sec without LOB for improved       gait renard and functional mobility. MET  4. Patient to improve FGA score to >/= 27 /30 to decrease client's risk of falls. MET  5. Patient to improve mini-BEST test balance score to >/= 25/28 to decrease risk of falls. ONGOING    LTG (12 visits)  1. Patient will be I with final HEP. ONGOING  2. Patient to perform TUG within 8 sec without LOB for improved functional mobility.ONGOING  3. Patient to ambulate 10 meters without AD within 8 sec without LOB for improved gait renard and functional mobility.ONGOING  4. Patient to improve FGA score to >/= 30/30 to decrease client's risk of falls.ONGOING  5. Patient to improve mini-BEST test balance score to >/= 28/28 to decrease client's risk of falls. ONGOING        Plan  Therapy options: will be seen for skilled physical therapy services  Planned modality interventions: TENS  Planned therapy interventions: ADL retraining, balance/weight-bearing training, flexibility, gait training, manual therapy, neuromuscular re-education, motor coordination training, postural training, strengthening, stretching, therapeutic activities, transfer training and home exercise program  Frequency: 1x week  Duration in visits: 4  Treatment plan discussed with: patient and caregiver  Plan details: Patient will be seen 1x/wk x 6wks with treatment to include strengthening, stretching, manual therapy, neuromuscular re-education, balance, gait and endurance training.           Visit Diagnoses:    ICD-10-CM ICD-9-CM   1. Impaired functional mobility, balance, gait, and endurance  Z74.09 V49.89   2. Cerebrovascular accident (CVA), unspecified mechanism (CMS/[facility])  I63.9 434.91       Progress toward previous goals: Partially Met      Recommendations: Continue as planned  Timeframe: 1 month    PT Signature: Pauly Huff, PT, DPT, MSCS, CDP  [place] License #:  [id number]    Based upon review of the patient's progress and continued therapy plan, it is my medical opinion that Maury Mei should continue physical therapy treatment at [facility]  [address] [place] [id number] [phone].    Signature: __________________________________  Provider, No Known    Timed:  Manual Therapy:    0     mins  38158;  Therapeutic Exercise:    20     mins  97840;     Neuromuscular Diomedes:    25    mins  47720;    Therapeutic Activity:     0     mins  53882;     Gait Trainin     mins  38805;     Electrical Stimulation:    0     mins  42569 ( );    Untimed:  Canalith Repositioning   0 mins  42995    Timed Treatment:   45   mins   Total Treatment:     45   mins

## 2020-10-19 ENCOUNTER — TREATMENT (OUTPATIENT)
Dept: PHYSICAL THERAPY | Facility: CLINIC | Age: 60
End: 2020-10-19

## 2020-10-19 DIAGNOSIS — Z74.09 IMPAIRED FUNCTIONAL MOBILITY, BALANCE, GAIT, AND ENDURANCE: Primary | ICD-10-CM

## 2020-10-19 DIAGNOSIS — I63.9 CEREBROVASCULAR ACCIDENT (CVA), UNSPECIFIED MECHANISM (HCC): ICD-10-CM

## 2020-10-19 PROCEDURE — 97110 THERAPEUTIC EXERCISES: CPT | Performed by: PHYSICAL THERAPIST

## 2020-10-19 PROCEDURE — 97112 NEUROMUSCULAR REEDUCATION: CPT | Performed by: PHYSICAL THERAPIST

## 2020-10-20 ENCOUNTER — TREATMENT (OUTPATIENT)
Dept: PHYSICAL THERAPY | Facility: CLINIC | Age: 60
End: 2020-10-20

## 2020-10-20 DIAGNOSIS — Z74.09 IMPAIRED MOBILITY AND ADLS: Primary | ICD-10-CM

## 2020-10-20 DIAGNOSIS — Z78.9 IMPAIRED MOBILITY AND ADLS: Primary | ICD-10-CM

## 2020-10-20 DIAGNOSIS — R27.8 DECREASED COORDINATION: ICD-10-CM

## 2020-10-20 PROCEDURE — 97530 THERAPEUTIC ACTIVITIES: CPT | Performed by: OCCUPATIONAL THERAPIST

## 2020-10-20 PROCEDURE — 97110 THERAPEUTIC EXERCISES: CPT | Performed by: OCCUPATIONAL THERAPIST

## 2020-10-20 NOTE — PROGRESS NOTES
"Occupational Therapy Daily Progress Note  Visit: 7  Date of Initial Visit: Type: THERAPY  Noted: 9/3/2020      Patient: Maury Mei   : 1960  Diagnosis/ICD-10 Code:  Impaired mobility and ADLs [Z74.09, Z78.9]  Referring practitioner: No Known Provider  Date of Initial Visit: Type: THERAPY  Noted: 9/3/2020  Today's Date: 10/20/2020  Patient seen for 7 sessions      Subjective:   Patient reports: \"I'm having a hard time getting my back pack blower on. I even have to have my wife help because my L shoulder just won't reach\"  Pain: 0/10  Subjective Questionnaire: n/a  Clinical Progress: improved  Home Program Compliance: Yes  Treatment has included: therapeutic exercise and therapeutic activity    Subjective   Objective     OT Neuro         OT Exercises     Row Name 10/20/20 1300             Exercise 1    Exercise Name 1  neural priming with warm-up, strengthening and activity tolerance building; 3 mins L 10 NuStep, 3 mins elliptical L 2  -ST      Time (Minutes) 1  6  -ST         Exercise 2    Exercise Name 2  calf and hamstring stretching using stretching strap; ALT R/L   -ST      Sets 2  2  -ST      Time (Seconds) 2  30  -ST         Exercise 3    Exercise Name 3  LUE loaded end strengthening using short tbar pronation/supination; wrist f/e  -ST      Equipment 3  Dowel  -ST      Weights/Plates 3  3  -ST      Sets 3  2  -ST      Reps 3  12  -ST         Exercise 4    Exercise Name 4  stepping laterally along foam balance beam performing OH press 7.5# kettle bell  -ST      Sets 4  2  -ST      Reps 4  15  -ST         Exercise 5    Exercise Name 5  stepping heel to toe along foam balance beam performing ALT CP/shoulder abd/adduction 7.5# kettle bell  -ST      Sets 5  2  -ST      Reps 5  12  -ST         Exercise 6    Exercise Name 6  OH arc using 5# therabar LUE for increasing distal stability and control for carry over with HM/IADL taks   -ST      Sets 6  2  -ST      Reps 6  10  -ST         Exercise 7    Exercise " Name 7  wall stretching, focus on scap retraction L   -ST      Sets 7  2  -ST      Reps 7  5  -ST      Time (Seconds) 7  15  -ST         Exercise 8    Exercise Name 8  scap depression  -ST      Sets 8  2  -ST      Reps 8  5  -ST         Exercise 9    Exercise Name 9  extension strenthening using rubber bands; see media tab for details   -ST      Sets 9  1  -ST      Reps 9  10  -ST        User Key  (r) = Recorded By, (t) = Taken By, (c) = Cosigned By    Initials Name Provider Type    Velvet Velasco OTR Occupational Therapist           Assessment & Plan     Assessment  Assessment details: Pt exhibits increased tightness and tension in trap L UE during rest but worse after sustained/repetitive exercises/tasks. Educated at length on relaxation and positioning along with depression exercises. Performed door stretches and issued for HEP along with increasing L digit strength through extensor strengthening isolated by resistive tasks.         Visit Diagnoses:    ICD-10-CM ICD-9-CM   1. Impaired mobility and ADLs  Z74.09 V49.89    Z78.9    2. Decreased coordination  R27.8 781.3             Timed:  Manual Therapy:    0     mins  72698;  Therapeutic Exercise:    30     mins  29285;     Neuromuscular Diomedes:    0    mins  09955;    Therapeutic Activity:     15     mins  90002;     Self-Care/ADL     0     mins  55290;   Sensory Int. Tech      0     mins 28818;  Ultrasound:     0     mins  26716;    Electrical Stimulation:    0     mins  16848 ( );    Untimed:  Electrical Stimulation:    0     mins  77090 ( );    Timed Treatment:   45   mins   Total Treatment:     45   mins    OT Signature: Velvet Hanson MS, OTR/L, LYLY  [place] License #: [id number]

## 2020-10-26 ENCOUNTER — TREATMENT (OUTPATIENT)
Dept: PHYSICAL THERAPY | Facility: CLINIC | Age: 60
End: 2020-10-26

## 2020-10-26 DIAGNOSIS — Z74.09 IMPAIRED FUNCTIONAL MOBILITY, BALANCE, GAIT, AND ENDURANCE: Primary | ICD-10-CM

## 2020-10-26 DIAGNOSIS — I63.9 CEREBROVASCULAR ACCIDENT (CVA), UNSPECIFIED MECHANISM (HCC): ICD-10-CM

## 2020-10-26 PROCEDURE — 97530 THERAPEUTIC ACTIVITIES: CPT | Performed by: PHYSICAL THERAPIST

## 2020-10-26 PROCEDURE — 97110 THERAPEUTIC EXERCISES: CPT | Performed by: PHYSICAL THERAPIST

## 2020-10-26 PROCEDURE — 97112 NEUROMUSCULAR REEDUCATION: CPT | Performed by: PHYSICAL THERAPIST

## 2020-10-26 NOTE — PROGRESS NOTES
Physical Therapy Daily Progress Note  Visit: 7  Date of Initial Visit: Type: THERAPY  Noted: 2020    Patient: Maury Mei   : 1960  Diagnosis/ICD-10 Code:  Impaired functional mobility, balance, gait, and endurance [Z74.09]  Referring practitioner: No Known Provider  Date of Initial Visit: Type: THERAPY  Noted: 2020  Today's Date: 10/26/2020  Patient seen for 7 sessions      Subjective:   Patient reports: he feels very stiff in the mornings.   Pain: 0/10  Clinical Progress: improved  Home Program Compliance: Yes  Treatment has included: therapeutic exercise, neuromuscular re-education and therapeutic activity    Objective   See Exercise, Manual, and Modality Logs for complete treatment.    PT Neuro          Assessment & Plan     Assessment  Assessment details: Patient continues with L sided weakness, particularly in the quads. He is initiating a flexibility program with his HEP to address tightness he is experiencing in the mornings and with exercise.     Plan  Plan details: Patient to continue with PT services to improve gait, balance, strength, transfers and overall functional mobility.          Timed:  Manual Therapy:            0     mins  89538;  Therapeutic Exercise:    18    mins  67605;     Neuromuscular Diomedes:     10   mins  31611;    Therapeutic Activity:      17     mins  61878;     Gait Trainin    mins  35950;     Electrical Stimulation:    0    mins  18013 ( );     Untimed:  Canalith Repositioning techniques _0_ [id number]      Timed Treatment:   45   mins   Total Treatment:     45   mins      Pauly Huff, PT, DPT, MSCS, CDP  [place] License #: [id number]  Physical Therapist

## 2020-11-02 ENCOUNTER — TREATMENT (OUTPATIENT)
Dept: PHYSICAL THERAPY | Facility: CLINIC | Age: 60
End: 2020-11-02

## 2020-11-02 DIAGNOSIS — Z74.09 IMPAIRED FUNCTIONAL MOBILITY, BALANCE, GAIT, AND ENDURANCE: Primary | ICD-10-CM

## 2020-11-02 DIAGNOSIS — I63.9 CEREBROVASCULAR ACCIDENT (CVA), UNSPECIFIED MECHANISM (HCC): ICD-10-CM

## 2020-11-02 PROCEDURE — 97112 NEUROMUSCULAR REEDUCATION: CPT | Performed by: PHYSICAL THERAPIST

## 2020-11-02 PROCEDURE — 97110 THERAPEUTIC EXERCISES: CPT | Performed by: PHYSICAL THERAPIST

## 2020-11-03 NOTE — PROGRESS NOTES
Physical Therapy Daily Progress Note  Visit: 8  Date of Initial Visit: Type: THERAPY  Noted: 2020    Patient: Maury Mei   : 1960  Diagnosis/ICD-10 Code:  Impaired functional mobility, balance, gait, and endurance [Z74.09]  Referring practitioner: Roxana Taylor DO  Date of Initial Visit: Type: THERAPY  Noted: 2020  Today's Date: 11/3/2020  Patient seen for 8 sessions      Subjective:   Patient reports: he is still feeling weak in the legs and is hoping his doctor releases him to drive this week.   Pain: 0/10  Clinical Progress: improved  Home Program Compliance: Yes  Treatment has included: therapeutic exercise, neuromuscular re-education and therapeutic activity    Objective   See Exercise, Manual, and Modality Logs for complete treatment.    PT Neuro   Exercise 1  Exercise Name 1: Elliptical   Equipment/Resistance 1: L3  Time: 8 min total - fwrd/back  Exercise 2  Exercise Name 2: TK on BOSU with UE press ups   Exercise 3  Exercise Name 3: HK knee lifts   Sets/Reps 3: 10 ea   Exercise 4  Exercise Name 4: Qp donkey kicks   Sets/Reps 4: 10 ea   Exercise 5  Exercise Name 5: seated wedge - ball toss, rotations and overhead lifts   Sets/Reps 5: 20 ea     Assessment & Plan     Assessment  Assessment details: Patient with increased weakness throughout LE's that require increased time to rest. He is able to perform good control with core stabilization exercises with UE movement. He will continue to progress as indicated for strengthening.     Plan  Plan details: Patient to continue with PT services to improve gait, balance, strength, transfers and overall functional mobility.          Timed:  Manual Therapy:            0     mins  78745;  Therapeutic Exercise:    8    mins  47108;     Neuromuscular Diomedes:    32    mins  04942;    Therapeutic Activity:      0     mins  48024;     Gait Trainin    mins  97791;     Electrical Stimulation:    0    mins  11014 ( );      Untimed:  Canalith Repositioning techniques _0_ [id number]      Timed Treatment:   40   mins   Total Treatment:     40   mins      Pauly Huff PT, DPT, MSCS, CDP  [place] License #: [id number]  Physical Therapist

## 2020-11-09 ENCOUNTER — TREATMENT (OUTPATIENT)
Dept: PHYSICAL THERAPY | Facility: CLINIC | Age: 60
End: 2020-11-09

## 2020-11-09 DIAGNOSIS — I63.9 CEREBROVASCULAR ACCIDENT (CVA), UNSPECIFIED MECHANISM (HCC): ICD-10-CM

## 2020-11-09 DIAGNOSIS — Z74.09 IMPAIRED FUNCTIONAL MOBILITY, BALANCE, GAIT, AND ENDURANCE: Primary | ICD-10-CM

## 2020-11-09 PROCEDURE — 97112 NEUROMUSCULAR REEDUCATION: CPT | Performed by: PHYSICAL THERAPIST

## 2020-11-09 PROCEDURE — 97110 THERAPEUTIC EXERCISES: CPT | Performed by: PHYSICAL THERAPIST

## 2020-11-09 NOTE — PROGRESS NOTES
Physical Therapy Daily Progress Note  Visit: 9  Date of Initial Visit: Type: THERAPY  Noted: 2020    Patient: Maury Mei   : 1960  Diagnosis/ICD-10 Code:  Impaired functional mobility, balance, gait, and endurance [Z74.09]  Referring practitioner: Roxana Taylor DO  Date of Initial Visit: Type: THERAPY  Noted: 2020  Today's Date: 2020  Patient seen for 9 sessions      Subjective:   Patient reports: he is hoping cutting caffeine has helped his sugar levels.   Pain: 0/10  Clinical Progress: improved  Home Program Compliance: Yes  Treatment has included: therapeutic exercise and neuromuscular re-education    Objective   See Exercise, Manual, and Modality Logs for complete treatment.    PT Neuro  Exercise 1  Exercise Name 1: Elliptical   Equipment/Resistance 1: L3  Time: 8 min total - fwrd/back  Exercise 2  Exercise Name 2: deep squat with 10/15# KB  and lift overhead   Sets/Reps 2: 2/10  Exercise 3  Exercise Name 3: SL dead lifts/golfer pick ups   Equipment/Resistance 3: max VC's and tactile   Sets/Reps 3: > 20 ea   Exercise 4  Exercise Name 4: lawnmowers with small lunge   Equipment/Resistance 4: 10# KB  Sets/Reps 4: 20 ea   Exercise 5  Exercise Name 5: BOSU standing with forward. lateral and backward step off   Sets/Reps 5: 5 ea way     Assessment & Plan     Assessment  Assessment details: Patient demonstrates difficulty dual tasking. Combining trunk rotation with a LE task was challenging. He will continue to work on activities related to this at home and the gym.     Plan  Plan details: Patient to continue with PT services to improve gait, balance, strength, transfers and overall functional mobility.          Timed:  Manual Therapy:            0     mins  49762;  Therapeutic Exercise:    10    mins  12182;     Neuromuscular Diomedes:    30    mins  38215;    Therapeutic Activity:      0     mins  19497;     Gait Trainin    mins  61374;     Electrical Stimulation:     0    mins  30010 ( );     Untimed:  Canalith Repositioning techniques _0_ 77399      Timed Treatment:   40   mins   Total Treatment:     40   mins      Pauly Huff PT, REAT, MSCS, CDP  [place] License #: [id number]  Physical Therapist

## 2020-11-10 ENCOUNTER — TREATMENT (OUTPATIENT)
Dept: PHYSICAL THERAPY | Facility: CLINIC | Age: 60
End: 2020-11-10

## 2020-11-10 DIAGNOSIS — Z74.09 IMPAIRED MOBILITY AND ADLS: Primary | ICD-10-CM

## 2020-11-10 DIAGNOSIS — Z78.9 IMPAIRED MOBILITY AND ADLS: Primary | ICD-10-CM

## 2020-11-10 DIAGNOSIS — R27.8 DECREASED COORDINATION: ICD-10-CM

## 2020-11-10 PROCEDURE — 97530 THERAPEUTIC ACTIVITIES: CPT | Performed by: OCCUPATIONAL THERAPIST

## 2020-11-10 PROCEDURE — 97110 THERAPEUTIC EXERCISES: CPT | Performed by: OCCUPATIONAL THERAPIST

## 2020-11-10 NOTE — PROGRESS NOTES
"OT Re-Assessment / Re-Certification        Patient: Maury Mei   : 1960  Diagnosis/ICD-10 Code:  Impaired mobility and ADLs [Z74.09, Z78.9]  Referring practitioner: Roxana Taylor DO  Date of Initial Visit: Type: THERAPY  Noted: 9/3/2020  Today's Date: 11/10/2020  Patient seen for 8 sessions      Subjective:   Patient reports: \"I started driving last week. I had my eyes checked 2 wks ago and need new glasses\"  Pain: 0/10  Subjective Questionnaire: 9HPT R: 19.01 L: 21.0  PURDUE PEGBOARD R: 13 L: 13 ROW: 10 ASSEMBLY: 24  Clinical Progress: improved  Home Program Compliance: Yes  Treatment has included: therapeutic exercise and therapeutic activity    Subjective   Objective     OT Neuro         Assessment & Plan     Assessment  Impairments: abnormal coordination, activity intolerance, impaired balance, impaired physical strength and lacks appropriate home exercise program  Assessment details: OT re-assessment completed per POC. Pt exhibited mild decrease in speed with FMC tests however improved accuracy with movements. Focus however w/therapy has been on GM patterns and shoulder/UE strengthening as pt has improved so significantly with FMC. Pt is still working on FM HEPs at home and progressing with UE stretching, ROM, flexibility and strengthening. Pt notes most difficulty with ER and stretching of LUE. Recommend continuation of skilled OT services to address these areas and focus on LUE ROM, strength, FMC/GMC, activity tolerance and full return to PLOF and work/IADL performance.        Prognosis: good  Functional Limitations: carrying objects, lifting, walking, pulling, pushing, standing, stooping, reaching overhead and unable to perform repetitive tasks  Goals  Plan Goals:   Pt will score 26 seconds or less L HAND on the 9HPT to demonstrate improved accuracy and speed with fine motor coordination by 8 wks. MET       Pt will complete dynamic standing activity X 10 mins with independent support while " incorporating BUE's to simulate ADL/IADL tasks by 8 wks. MET     Pt will be independent with L hand strengthening HEP to increase independence with ADL/IADL performance tasks by 4 wks. MET    Pt will be independent with L hand FMC HEP to increase independence with ADL/IADL performance tasks by 4 wks. MET    Pt will be independent with LUE strengthening HEP to aid with return to work/IADL function by d/c. NEW GOAL ADDED 10/13/2020; PROGRESSING DIFFICULTY         Plan  Planned therapy interventions: ADL retraining, balance/weight-bearing training, fine motor coordination training, flexibility, functional ROM exercises, home exercise program, IADL retraining, motor coordination training, neuromuscular re-education, postural training, strengthening, stretching, therapeutic activities and transfer training  Frequency: 1x week  Duration in weeks: 6  Plan details: Continue w/skilled OT POC and goals to reflect above deficits and promote increased independence, safety and engagement in daily tasks.         Visit Diagnoses:    ICD-10-CM ICD-9-CM   1. Impaired mobility and ADLs  Z74.09 V49.89    Z78.9    2. Decreased coordination  R27.8 781.3       Progress toward previous goals: Partially Met      Recommendations: Continue as planned  Timeframe: 6 weeks  Prognosis to achieve goals: good    OT Signature: Velvet Hanson MS, OTR/L, CDP  [place] License #: [id number]    Based upon review of the patient's progress and continued therapy plan, it is my medical opinion that Maury Mei should continue occupational therapy treatment at [facility] TH[facility]  [address] [place] [address] [phone].    Signature: __________________________________  Roxana Taylor,     Timed:  Manual Therapy:    0     mins  55138;  Therapeutic Exercise:    12     mins  80316;     Neuromuscular Diomedes:    0    mins  15793;    Therapeutic Activity:     20     mins  16262;     Self-Care/ADL     0      mins  22985;   Sensory Int. Tech      0     mins 27151;  Ultrasound:     0     mins  75593;    Electrical Stimulation:    0     mins  82221 ( );    Untimed:  Electrical Stimulation:    0     mins  63551 ( );    Timed Treatment:   32   mins   Total Treatment:     32   mins

## 2020-11-16 ENCOUNTER — TREATMENT (OUTPATIENT)
Dept: PHYSICAL THERAPY | Facility: CLINIC | Age: 60
End: 2020-11-16

## 2020-11-16 DIAGNOSIS — Z74.09 IMPAIRED FUNCTIONAL MOBILITY, BALANCE, GAIT, AND ENDURANCE: Primary | ICD-10-CM

## 2020-11-16 DIAGNOSIS — I63.9 CEREBROVASCULAR ACCIDENT (CVA), UNSPECIFIED MECHANISM (HCC): ICD-10-CM

## 2020-11-16 PROCEDURE — 97112 NEUROMUSCULAR REEDUCATION: CPT | Performed by: PHYSICAL THERAPIST

## 2020-11-16 PROCEDURE — 97530 THERAPEUTIC ACTIVITIES: CPT | Performed by: PHYSICAL THERAPIST

## 2020-11-16 PROCEDURE — 97110 THERAPEUTIC EXERCISES: CPT | Performed by: PHYSICAL THERAPIST

## 2020-11-16 NOTE — PROGRESS NOTES
PT Re-Assessment / Re-Certification  Visit: 10  Date of Initial Visit: Type: THERAPY  Noted: 2020    Patient: Maury Mei   : 1960  Diagnosis/ICD-10 Code:  Impaired functional mobility, balance, gait, and endurance [Z74.09]  Referring practitioner: Roxana Taylor DO  Date of Initial Visit: Type: THERAPY  Noted: 2020  Today's Date: 2020  Patient seen for 10 sessions      Subjective:   Patient reports: he has been noticing he still veers to the L.   Pain: 0/10  Clinical Progress: improved  Home Program Compliance: Yes  Treatment has included: therapeutic exercise, neuromuscular re-education and therapeutic activity    Objective   See Exercise, Manual, and Modality Logs for complete treatment.  TU.28 sec  10 M: 12.03 sec  FGA:   ANGULO:     PT Neuro  Exercise 1  Exercise Name 1: Elliptical   Equipment/Resistance 1: L3  Time: 8 min total - fwrd/back  Exercise 2  Exercise Name 2: re-assessment performed   Exercise 3  Exercise Name 3: MET to L hip extensors   Sets/Reps 3: 5 @ 3 sec   Exercise 4  Exercise Name 4: 4 way dra lunge/squat   Equipment/Resistance 4: cues for correctness   Sets/Reps 4: 5 ea way     Assessment & Plan     Assessment  Impairments: abnormal coordination, abnormal gait, activity intolerance, impaired balance, impaired physical strength and safety issue  Assessment details: Patient has made several lifestyle modifications over the last month that has impacted his physical functioning. Patient has cut all caffeine and is attempting to quit smoking. His energy has been low which has impacted exercise and physical functioning. He is now starting to make improvements again. Focus has been adjusted on return to work in January. Patient is unable to perform tasks required of his job. Due to this, patient will continue to require skilled PT intervention to address deficits in order to improve function and global mobility.   Prognosis: fair  Functional Limitations:  carrying objects, walking, standing and stooping  Goals  Plan Goals: STG (6 visits)  1. Patient will report compliance with initial HEP. MET  2. Patient to perform TUG within 9 sec without LOB for improved functional mobility.MET  3. Patient to ambulate 10 meters without AD within 9 sec without LOB for improved       gait renard and functional mobility. MET  4. Patient to improve FGA score to >/= 27 /30 to decrease client's risk of falls. MET  5. Patient to improve mini-BEST test balance score to >/= 25/28 to decrease risk of falls. ONGOING    LTG (12 visits)  1. Patient will be I with final HEP. ONGOING  2. Patient to perform TUG within 8 sec without LOB for improved functional mobility.ONGOING  3. Patient to ambulate 10 meters without AD within 8 sec without LOB for improved gait renard and functional mobility.ONGOING  4. Patient to improve FGA score to >/= 30/30 to decrease client's risk of falls.ONGOING  5. Patient to improve mini-BEST test balance score to >/= 28/28 to decrease client's risk of falls. ONGOING        Plan  Therapy options: will be seen for skilled physical therapy services  Planned modality interventions: TENS  Planned therapy interventions: ADL retraining, balance/weight-bearing training, flexibility, gait training, manual therapy, neuromuscular re-education, motor coordination training, postural training, strengthening, stretching, therapeutic activities, transfer training and home exercise program  Frequency: 1x week  Duration in visits: 4  Treatment plan discussed with: patient and caregiver  Plan details: Patient will be seen 1x/wk x 4wks with treatment to include strengthening, stretching, manual therapy, neuromuscular re-education, balance, gait and endurance training.           Visit Diagnoses:    ICD-10-CM ICD-9-CM   1. Impaired functional mobility, balance, gait, and endurance  Z74.09 V49.89   2. Cerebrovascular accident (CVA), unspecified mechanism (CMS/[facility])  I63.9 434.91        Progress toward previous goals: Partially Met      Recommendations: Continue as planned  Timeframe: 1 month    PT Signature: Pauly Huff, PT, DPT, MSCS, CDP  [place] License #: [id number]    Based upon review of the patient's progress and continued therapy plan, it is my medical opinion that Maury Mei should continue physical therapy treatment at [facility]  [facility] [facility] [address] [phone].    Signature: __________________________________  Roxana Taylor DO    Timed:  Manual Therapy:    0     mins  32633;  Therapeutic Exercise:    10     mins  39925;     Neuromuscular Diomedes:    10    mins  56045;    Therapeutic Activity:     25     mins  09387;     Gait Trainin     mins  45425;     Electrical Stimulation:    0     mins  65497 ( );    Untimed:  Canalith Repositioning   0 mins  47655    Timed Treatment:   45   mins   Total Treatment:     45   mins

## 2020-11-19 ENCOUNTER — TREATMENT (OUTPATIENT)
Dept: PHYSICAL THERAPY | Facility: CLINIC | Age: 60
End: 2020-11-19

## 2020-11-19 DIAGNOSIS — R27.8 DECREASED COORDINATION: ICD-10-CM

## 2020-11-19 DIAGNOSIS — Z78.9 IMPAIRED MOBILITY AND ADLS: Primary | ICD-10-CM

## 2020-11-19 DIAGNOSIS — Z74.09 IMPAIRED MOBILITY AND ADLS: Primary | ICD-10-CM

## 2020-11-19 PROCEDURE — 97110 THERAPEUTIC EXERCISES: CPT | Performed by: OCCUPATIONAL THERAPIST

## 2020-11-19 NOTE — PROGRESS NOTES
"Occupational Therapy Daily Progress Note  Visit: 9  Date of Initial Visit: Type: THERAPY  Noted: 9/3/2020      Patient: Maury Mei   : 1960  Diagnosis/ICD-10 Code:  Impaired mobility and ADLs [Z74.09, Z78.9]  Referring practitioner: Roxana Taylor DO  Date of Initial Visit: Type: THERAPY  Noted: 9/3/2020  Today's Date: 2020  Patient seen for 9 sessions      Subjective:   Patient reports: \"That last exercise you gave me makes my arm tired\"  Pain: 0/10  Subjective Questionnaire: n/a  Clinical Progress: improved  Home Program Compliance: Yes  Treatment has included: therapeutic exercise    Subjective   Objective     OT Neuro         OT Exercises     Row Name 20 1518             Precautions    Existing Precautions/Restrictions  fall  -ST         Exercise 1    Exercise Name 1  neural priming with UE strengthening and ROM along with activity tolerance building using UBE L 5.5  -ST      Time (Minutes) 1  8  -ST         Exercise 2    Exercise Name 2  shoulder extension BUEs holding 3# therabar away from hips; cuing for extended wrists/elbows  -ST      Sets 2  3  -ST      Reps 2  10  -ST         Exercise 3    Exercise Name 3  shoulder OH flexion and abd/adduction using wash cloth LUE  -ST      Sets 3  2  -ST      Reps 3  10  -ST         Exercise 4    Exercise Name 4  IR/ER  -ST      Equipment 4  Theraband  -ST      Resistance 4  Green  -ST      Sets 4  2  -ST      Reps 4  15  -ST         Exercise 5    Exercise Name 5  shoulder f/e  -ST      Equipment 5  Theraband  -ST      Resistance 5  Green  -ST      Sets 5  2  -ST      Reps 5  15  -ST         Exercise 6    Exercise Name 6  shoulder abd/adduction  -ST      Equipment 6  Theraband  -ST      Resistance 6  Green  -ST      Sets 6  2  -ST      Reps 6  15  -ST        User Key  (r) = Recorded By, (t) = Taken By, (c) = Cosigned By    Initials Name Provider Type    Velvet Velasco OTR Occupational Therapist           Assessment & Plan "     Assessment  Assessment details: Pt requiring cuing for maintaining elbow and wrist extension w/shoulder extension bar exercise along with scapular retraction. Pt states improved flexibility and ROM s/p this exercise. He has been more active performing yard work w/raking leaves and notes having more difficulty placing bag on mower than previously.     Plan  Plan details: Continue w/POC and goals with focus on L shoulder & UE ROM/flexibility, strength and coordination.        Visit Diagnoses:    ICD-10-CM ICD-9-CM   1. Impaired mobility and ADLs  Z74.09 V49.89    Z78.9    2. Decreased coordination  R27.8 781.3             Timed:  Manual Therapy:    0     mins  92531;  Therapeutic Exercise:    39     mins  57030;     Neuromuscular Diomedes:    0    mins  18290;    Therapeutic Activity:     0     mins  78408;     Self-Care/ADL     0     mins  83714;   Sensory Int. Tech      0     mins 64953;  Ultrasound:     0     mins  87919;    Electrical Stimulation:    0     mins  00698 ( );    Untimed:  Electrical Stimulation:    0     mins  12043 ( );    Timed Treatment:   39   mins   Total Treatment:     39   mins    OT Signature: Velvet Hanson MS, OTR/L, CDP  [place] License #: [id number]

## 2020-11-23 ENCOUNTER — TREATMENT (OUTPATIENT)
Dept: PHYSICAL THERAPY | Facility: CLINIC | Age: 60
End: 2020-11-23

## 2020-11-23 DIAGNOSIS — I63.9 CEREBROVASCULAR ACCIDENT (CVA), UNSPECIFIED MECHANISM (HCC): ICD-10-CM

## 2020-11-23 DIAGNOSIS — Z74.09 IMPAIRED FUNCTIONAL MOBILITY, BALANCE, GAIT, AND ENDURANCE: Primary | ICD-10-CM

## 2020-11-23 PROCEDURE — 97110 THERAPEUTIC EXERCISES: CPT | Performed by: PHYSICAL THERAPIST

## 2020-11-23 PROCEDURE — 97112 NEUROMUSCULAR REEDUCATION: CPT | Performed by: PHYSICAL THERAPIST

## 2020-11-24 ENCOUNTER — TRANSCRIBE ORDERS (OUTPATIENT)
Dept: PHYSICAL THERAPY | Facility: CLINIC | Age: 60
End: 2020-11-24

## 2020-11-24 DIAGNOSIS — I69.322 DYSARTHRIA, POST-STROKE: Primary | ICD-10-CM

## 2020-11-24 NOTE — PROGRESS NOTES
Physical Therapy Daily Progress Note  Visit: 11  Date of Initial Visit: Type: THERAPY  Noted: 2020    Patient: Maury Mei   : 1960  Diagnosis/ICD-10 Code:  Impaired functional mobility, balance, gait, and endurance [Z74.09]  Referring practitioner: Roxana Taylor DO  Date of Initial Visit: Type: THERAPY  Noted: 2020  Today's Date: 2020  Patient seen for 11 sessions      Subjective:   Patient reports: he is still not able to drive because he veers to the left. It feels dangerous to him.   Pain: 0/10  Clinical Progress: improved  Home Program Compliance: Yes  Treatment has included: therapeutic exercise and neuromuscular re-education    Objective   See Exercise, Manual, and Modality Logs for complete treatment.    PT Neuro   Exercise 1  Exercise Name 1: Elliptical   Equipment/Resistance 1: L3  Time: 8 min total - fwrd/back  Exercise 2  Exercise Name 2: Discussion of speech order for multi tasking activities q  Exercise 3  Exercise Name 3: walking through agility ladder skipping a box, progressed to skipping a box and tossing a ball upward, downward ball bounce, taking away vision, keeping vision while looking down   Exercise 4  Exercise Name 4: narrow walking with and without vision while performing a cognitive task        Assessment & Plan     Assessment  Assessment details: Patient continues to c/o running into objects on his left side, as well as being unable to drive due to safety reasons, such as hovering on the L side of the road. Patient has no physical reason that should be impacting him from driving. However, during session today, he was asked to dual and multi task. This caused a marked increase in incoordination and LOB to the L. I believe that while patient is driving, he is veering to the L because of distraction or by simply taking his gaze off of the road to check his mirrors, etc. This is causing veering. Patient's physician will be contacted for a speech therapy consult in  order to address dual/multi tasking deficits.     Plan  Plan details: Patient to continue with PT services to improve gait, balance, strength, transfers and overall functional mobility.          Timed:  Manual Therapy:            0     mins  72642;  Therapeutic Exercise:    8    mins  31299;     Neuromuscular Diomedes:    30    mins  00683;    Therapeutic Activity:      0     mins  99471;     Gait Trainin    mins  32500;     Electrical Stimulation:    0    mins  05749 ( );     Untimed:  Canalith Repositioning techniques _0_ 60784      Timed Treatment:   38   mins   Total Treatment:     38   mins      Pauly Huff, PT, DPT, MSCS, CDP  [place] License #: [id number]  Physical Therapist

## 2020-11-30 ENCOUNTER — TREATMENT (OUTPATIENT)
Dept: PHYSICAL THERAPY | Facility: CLINIC | Age: 60
End: 2020-11-30

## 2020-11-30 DIAGNOSIS — I63.9 CEREBROVASCULAR ACCIDENT (CVA), UNSPECIFIED MECHANISM (HCC): ICD-10-CM

## 2020-11-30 DIAGNOSIS — Z74.09 IMPAIRED FUNCTIONAL MOBILITY, BALANCE, GAIT, AND ENDURANCE: Primary | ICD-10-CM

## 2020-11-30 PROCEDURE — 97112 NEUROMUSCULAR REEDUCATION: CPT | Performed by: PHYSICAL THERAPIST

## 2020-11-30 PROCEDURE — 97110 THERAPEUTIC EXERCISES: CPT | Performed by: PHYSICAL THERAPIST

## 2020-12-01 ENCOUNTER — OFFICE VISIT (OUTPATIENT)
Dept: PHYSICAL THERAPY | Facility: CLINIC | Age: 60
End: 2020-12-01

## 2020-12-01 ENCOUNTER — TREATMENT (OUTPATIENT)
Dept: PHYSICAL THERAPY | Facility: CLINIC | Age: 60
End: 2020-12-01

## 2020-12-01 DIAGNOSIS — Z74.09 IMPAIRED MOBILITY AND ADLS: Primary | ICD-10-CM

## 2020-12-01 DIAGNOSIS — R27.8 DECREASED COORDINATION: ICD-10-CM

## 2020-12-01 DIAGNOSIS — Z78.9 IMPAIRED MOBILITY AND ADLS: Primary | ICD-10-CM

## 2020-12-01 DIAGNOSIS — R41.841 COGNITIVE COMMUNICATION DEFICIT: Primary | ICD-10-CM

## 2020-12-01 PROCEDURE — 97110 THERAPEUTIC EXERCISES: CPT | Performed by: OCCUPATIONAL THERAPIST

## 2020-12-01 PROCEDURE — 97530 THERAPEUTIC ACTIVITIES: CPT | Performed by: OCCUPATIONAL THERAPIST

## 2020-12-01 PROCEDURE — 92523 SPEECH SOUND LANG COMPREHEN: CPT | Performed by: SPEECH-LANGUAGE PATHOLOGIST

## 2020-12-01 PROCEDURE — 97112 NEUROMUSCULAR REEDUCATION: CPT | Performed by: OCCUPATIONAL THERAPIST

## 2020-12-01 NOTE — PROGRESS NOTES
"Occupational Therapy Daily Progress Note  Visit: 10  Date of Initial Visit: Type: THERAPY  Noted: 9/3/2020      Patient: Maury Mei   : 1960  Diagnosis/ICD-10 Code:  Impaired mobility and ADLs [Z74.09, Z78.9]  Referring practitioner: Roxana Taylor DO  Date of Initial Visit: Type: THERAPY  Noted: 9/3/2020  Today's Date: 2020  Patient seen for 10 sessions      Subjective:   Patient reports: \"my arm is doing better\"  Pain: 0/10  Subjective Questionnaire: n/a  Clinical Progress: good  Home Program Compliance: Yes  Treatment has included: therapeutic exercise, neuromuscular re-education and therapeutic activity    Subjective   Objective     OT Neuro         OT Exercises     Row Name 20 1000             Precautions    Existing Precautions/Restrictions  fall  -ST         Exercise 1    Exercise Name 1  neural priming with activity tolerance building/balance and strengthening using elliptical forward only   -ST      Time (Minutes) 1  6  -ST         Exercise 2    Exercise Name 2  backward lunging toss/catch 4# med ball w/ cog load  -ST         Exercise 3    Exercise Name 3  forward lunging toss/catch 4# med ball w/ cog load  -ST         Exercise 4    Exercise Name 4  STS from stool, tapping stool w/o full sit   -ST      Sets 4  2  -ST      Reps 4  10  -ST         Exercise 5    Exercise Name 5  donning/doffing backback, donning L UE first to improve ease and independence   -ST         Exercise 6    Exercise Name 6  computer typing; from prompt then from typing from OT's voice; varying speeds and letters/words   -ST        User Key  (r) = Recorded By, (t) = Taken By, (c) = Cosigned By    Initials Name Provider Type    Velvet Velasco OTR Occupational Therapist           Assessment & Plan     Assessment  Assessment details: Pt progressing with all HEPs but reports trouble with typing speed and accuracy. After practicing both copying from paper and from OT speaking aloud, pt improving but would " benefit from continued work at home to increase accuracy/speed. Will continue to address to improve pt satisfaction. Pt demonstrated increased ease with simulated donning of leaf-blower bag by donning over LUE first. Pt demonstrates more difficulty with multi-tasking UE/LE movements w/cog load.    Plan  Plan details: Continue w/OT POC and goals as previously est.         Visit Diagnoses:    ICD-10-CM ICD-9-CM   1. Impaired mobility and ADLs  Z74.09 V49.89    Z78.9    2. Decreased coordination  R27.8 781.3             Timed:  Manual Therapy:    0     mins  48566;  Therapeutic Exercise:    15     mins  37253;     Neuromuscular Diomedes:    15    mins  41329;    Therapeutic Activity:     15     mins  11264;     Self-Care/ADL     0     mins  29021;   Sensory Int. Tech      0     mins 05729;  Ultrasound:     0     mins  51971;    Electrical Stimulation:    0     mins  38517 ( );    Untimed:  Electrical Stimulation:    0     mins  50189 ( );    Timed Treatment:   45   mins   Total Treatment:     45   mins    OT Signature: Velvet Hanson MS, OTR/L, CDP  [place] License #: [id number]

## 2020-12-01 NOTE — PROGRESS NOTES
Outpatient Speech Language Pathology   Adult Speech Language Cognitive Initial Evaluation       Patient Name: Maury Mei  : 1960  MRN: [id number]  Today's Date: 2020        Visit Date: 2020   There is no problem list on file for this patient.       Past Medical History:   Diagnosis Date   • Allergic    • Stroke (CMS/HCC)         No past surgical history on file.      Visit Dx:    ICD-10-CM ICD-9-CM   1. Cognitive communication deficit  R41.841 799.52     LTGs  Pt will be able to safely participate and attend to stimuli in all settings with 90% accuracy and no cues.   Pt will be able to use high level cognitive skills to allow patient to return to work with 90% accuracy and no cues.     STGs  Pt will improve attention skills by sustaining focus to selective target/task when presented with competing stimuli or in a distracting environment in order to complete a task with 90% accuracy and minimal cues.   Pt will improve attention skills by alternating or shifting focus between two different tasks in order to complete both tasks with 90% accuracy and minimal cues.   Pt will improve attention skills by dividing focus and responding simultaneously to multiple tasks or in order to complete task with 90% accuracy and minimal cues.   Pt will improve executive functioning skills by using self-monitoring strategies during functional tasks with 90% accuracy and no cues.     Recertification: 2021        SLP SLC Evaluation - 20 1200        Communication Assessment/Intervention    Document Type  evaluation  (Pended)    -LL    Total Evaluation Minutes, SLP  45  (Pended)    -LL    Subjective Information  no complaints  (Pended)    -LL    Patient Observations  alert;cooperative;agree to therapy  (Pended)    -LL    Patient Effort  good  (Pended)    -LL    Symptoms Noted During/After Treatment  none  (Pended)    -LL       General Information    Patient Profile Reviewed  yes  (Pended)    -LL     Pertinent History Of Current Problem  Pt is a 60 y.o. male who presents with cognitive difficulty. Pt experienced a CVA in August 2020. Pt was referred by his PT. He receives PT and OT and reports that he has difficulty multi-tasking. He provides examples such as difficulty walking in a straight line while engaging in conversation with his PT or difficulty driving while his wife is speaking. He notes he has had to pull over on the side of the road when a conversation is started during driving. Pt reports difficulty with sustained attention as well. He notes he has trouble following a conversation if the topic is changed. Pt reports no difficulty with memory, reading or auditory comprehension, graphic expression, or swallowing. Pt notes he feels he thinks faster than he speaks which causes stuttering. No disfluency was observed during evaluation, but pt reports this difficulty has onset since CVA. Pt lives with wife and son, but reports complicated relationship with son. All other family live in [place]. Pt is a  for [employer]. He is currently on FMLA. He notes that his job requires frequent driving/traveling. He was hoping to return to work in January, but notes he does not think this is possible due to his cognitive and physical difficulty.  (Pended)    -LL    Precautions/Limitations, Vision  WFL;for purposes of eval  (Pended)    -LL    Precautions/Limitations, Hearing  WFL;for purposes of eval  (Pended)    -LL    Patient Level of Education  3 years of college  (Pended)    -LL    Prior Level of Function-Communication  WFL  (Pended)    -LL    Plans/Goals Discussed with  patient  (Pended)    -LL    Barriers to Rehab  none identified  (Pended)    -LL    Patient's Goals for Discharge  functional cognition  (Pended)    -LL    Standardized Assessment Used  RBANS  (Pended)    -LL       Pain    Additional Documentation  Pain Scale: Numbers Pre/Post-Treatment (Group)  (Pended)    -LL       Pain Scale:  Numbers Pre/Post-Treatment    Pretreatment Pain Rating  0/10 - no pain  (Pended)    -LL    Posttreatment Pain Rating  0/10 - no pain  (Pended)    -LL       Comprehension Assessment/Intervention    Comprehension Assessment/Intervention  Auditory Comprehension;Reading Comprehension  (Pended)    -LL       Auditory Comprehension Assessment/Intervention    Auditory Comprehension (Communication)  mild impairment  (Pended)    -LL    Able to Identify Objects/Pictures (Communication)  WFL;familiar objects;pictures of common objects  (Pended)    -LL    Answers Questions (Communication)  WFL;yes/no;wh questions;personal;simple;concrete;mulit-unit;complex;abstract  (Pended)    -LL    Able to Follow Commands (Communication)  WFL;1-step;2-step;multi-step  (Pended)    -LL    Narrative Discourse  WFL;conversational level  (Pended)    -LL    Successful Auditory Strategies (Communication)  decrease environmental distractions  (Pended)    -LL    Auditory Comprehension Communication, Comment  Pt reports no difficulty with comprehension, but occasional difficulty following conversation. He notes the most difficulty when the conversation topic is changed. It should be noted that his difficulty with attention and environmental distractions could impact auditory comprehension during conversation.   (Pended)    -LL       Reading Comprehension Assessment/Intervention    Reading Comprehension (Communication)  WFL  (Pended)    -LL    Reading Comprehension, Comment  Pt reports no difficulty  (Pended)    -LL       Expression Assessment/Intervention    Expression Assessment/Intervention  verbal expression;graphic expression  (Pended)    -LL       Verbal Expression Assessment/Intervention    Verbal Expression  mild impairment  (Pended)    -LL    Automatic Speech (Communication)  WFL;response to greeting  (Pended)    -LL    Repetition  WFL;words  (Pended)    -LL    Responsive Naming  WFL;simple;complex  (Pended)    -LL    Confrontational Naming   "WFL;high frequency;low frequency  (Pended)    -LL    Spontaneous/Functional Words  WFL;simple;complex  (Pended)    -LL    Sentence Formulation  WFL;simple;complex  (Pended)    -LL    Conversational Discourse/Fluency  WFL  (Pended)    -LL    Verbal Expression, Comment  Pt reports he stutters occasionally. He describes this as \"thinking faster than I speak.\" He notes he used to speak very fast but feels this has been difficult since CVA.   (Pended)    -LL       Graphic Expression Assessment/Intervention    Graphic Expression  WFL  (Pended)    -LL    Graphic Expression, Comment  Pt reports no difficulty  (Pended)    -LL       Oral Motor Structure and Function    Oral Motor Structure and Function  WFL  (Pended)    -LL    Dentition Assessment  has dentures but does not use;other (see comments)  (Pended)     Pt reports dentures, did not wear today  -LL    Mucosal Quality  moist, healthy  (Pended)    -LL       Oral Musculature and Cranial Nerve Assessment    Oral Motor General Assessment  WFL  (Pended)    -LL       Motor Speech Assessment/Intervention    Motor Speech Function  WFL  (Pended)    -LL       Cursory Voice Assessment/Intervention    Quality and Resonance (Voice)  WFL  (Pended)    -LL       Cognitive Assessment Intervention- SLP    Cognitive Function (Cognition)  mild impairment  (Pended)    -LL    Orientation Status (Cognition)  WFL  (Pended)    -LL    Memory (Cognitive)  WFL;simple;mod-complex;functional;immediate;long-term;short-term;delayed  (Pended)    -LL    Attention (Cognitive)  mild impairment;sustained;alternating;divided;distracting environment  (Pended)    -LL    Thought Organization (Cognitive)  WFL;concrete divergent;abstract divergent;concrete convergent;abstract convergent  (Pended)    -LL    Reasoning (Cognitive)  WFL;simple;mod-complex;complex  (Pended)    -LL    Problem Solving (Cognitive)  WFL;simple;divergent;multifactorial;temporal  (Pended)    -LL    Executive Function (Cognition)  " WFL;deficit awareness;judgement;initiation  (Pended)    -LL    Pragmatics (Communication)  WFL  (Pended)    -LL    Cognition, Comment  Pt reports difficulty with attention. He notes it is difficult to alternate or divide his attention. He also reports difficulty sustaining attention to activities such as reading.   (Pended)    -LL       Standardized Tests    Cognitive/Memory Tests  RBANS: Repeatable Battery for the Assessment of Neuropsychological Status  (Pended)    -LL       RBANS- Repeatable Battery for the Assessment of Neuropsychological Status    Immediate Memory Index Score  87  (Pended)    -LL    Immediate Memory Qualitative Description  low average  (Pended)    -LL    Visuospatial Index Score  126  (Pended)    -LL    Visuospatial Qualitative Description  superior  (Pended)    -LL    Language Index Score  90  (Pended)    -LL    Language Qualitative Description  average;low average  (Pended)    -LL    Attention Index Score  103  (Pended)    -LL    Attention Qualitative Description  average  (Pended)    -LL    Delayed Memory Index Score  108  (Pended)    -LL    Delayed Memory Qualitative Description  average;high average  (Pended)    -LL    Total Index Score  514  (Pended)    -LL    RBANS Comments  Pt's scores fall within the standardized normal range in comparison to his age group. It should be noted that pt reports the most difficulty with attention when competing stimuli is present. The testing environment was quiet today, so pt's score may not be reflective of his attention in typical environments. It should also be noted that pt's difficulty with attention may impact his STM. Pt reports that he experienced some difficulty with attention throughout the assessment.   (Pended)    -LL      User Key  (r) = Recorded By, (t) = Taken By, (c) = Cosigned By    Initials Name Provider Type    Medina Wharton Speech Therapy Student Speech Therapy Student                         OP SLP Education     Row Name  12/01/20 1200       Education    Barriers to Learning  No barriers identified  (Pended)   -LL    Education Provided  Described results of evaluation;Patient expressed understanding of evaluation;Patient requires further education on strategies, risks  (Pended)   -LL    Assessed  Learning needs;Learning motivation;Learning preferences;Learning readiness  (Pended)   -LL    Learning Motivation  Strong  (Pended)   -LL    Learning Method  Explanation;Demonstration;Written materials  (Pended)   -LL    Teaching Response  Verbalized understanding;Demonstrated understanding;Reinforcement needed  (Pended)   -LL    Education Comments  Memory/attention strategy sheet  (Pended)   -LL      User Key  (r) = Recorded By, (t) = Taken By, (c) = Cosigned By    Initials Name Effective Dates     Medina Giron, Speech Therapy Student 09/01/20 -               OP SLP Assessment/Plan - 12/01/20 1200        SLP Assessment    Functional Problems  Speech Language- Adult/Cognition  (Pended)    -LL    Impact on Function: Adult Speech Language/Cognition  Poor attention to task;Trouble learning or remembering new information;Restrictions in personal and social life  (Pended)    -LL    Clinical Impression: Speech Language-Adult/Congnition  Mild:;Cognitive Communication Impairment  (Pended)    -LL    Functional Problems Comment  Pt's attention impacts daily tasks and work-related tasks  (Pended)    -LL    Clinical Impression Comments  Pt would benefit from skilled ST to address cognition  (Pended)    -LL    Please refer to paper survey for additional self-reported information  Yes  (Pended)    -LL    Please refer to items scanned into chart for additional diagnostic informaiton and handouts as provided by clinician  Yes  (Pended)    -LL    SLP Diagnosis  Mild cognitive communication impairment  (Pended)    -LL    Prognosis  Good (comment)  (Pended)    -LL    Patient/caregiver participated in establishment of treatment plan and goals  Yes   (Pended)    -LL    Patient would benefit from skilled therapy intervention  Yes  (Pended)    -LL       SLP Plan    Frequency  1x/weekly  (Pended)    -LL    Duration  8 weeks  (Pended)    -LL    Planned CPT's?  SLP INDIVIDUAL SPEECH THERAPY: 14555  (Pended)    -LL    Expected Duration of Therapy Session (SLP Eval)  45  (Pended)    -LL    Plan Comments  Initiate POC  (Pended)    -LL      User Key  (r) = Recorded By, (t) = Taken By, (c) = Cosigned By    Initials Name Provider Type    Medina Wharton, Speech Therapy Student Speech Therapy Student        Medina Giron, SLP student, provided treatment under my direct supervision  Darlyn Bull MA [facility]-SLP  Medina Giron, Speech Therapy Student  12/1/2020

## 2020-12-07 ENCOUNTER — TREATMENT (OUTPATIENT)
Dept: PHYSICAL THERAPY | Facility: CLINIC | Age: 60
End: 2020-12-07

## 2020-12-07 DIAGNOSIS — I63.9 CEREBROVASCULAR ACCIDENT (CVA), UNSPECIFIED MECHANISM (HCC): ICD-10-CM

## 2020-12-07 DIAGNOSIS — Z74.09 IMPAIRED FUNCTIONAL MOBILITY, BALANCE, GAIT, AND ENDURANCE: Primary | ICD-10-CM

## 2020-12-07 PROCEDURE — 97110 THERAPEUTIC EXERCISES: CPT | Performed by: PHYSICAL THERAPIST

## 2020-12-07 NOTE — PROGRESS NOTES
Physical Therapy Daily Progress Note  Visit: 13  Date of Initial Visit: Type: THERAPY  Noted: 2020    Patient: Maury Mei   : 1960  Diagnosis/ICD-10 Code:  Impaired functional mobility, balance, gait, and endurance [Z74.09]  Referring practitioner: No Known Provider  Date of Initial Visit: Type: THERAPY  Noted: 2020  Today's Date: 2020  Patient seen for 13 sessions      Subjective:   Patient reports: he wants to try to do the steps at the gym.   Pain: 0/10  Clinical Progress: improved  Home Program Compliance: Yes  Treatment has included: therapeutic exercise and neuromuscular re-education    Objective   See Exercise, Manual, and Modality Logs for complete treatment.    PT Neuro   Exercise 1  Exercise Name 1: Elliptical   Equipment/Resistance 1: L3  Time: 8 min total - fwrd/back  Exercise 2  Exercise Name 2: reverse lunge   Sets/Reps 2: 10  Exercise 3  Exercise Name 3: bridge SL   Time 3: 15  Exercise 4  Exercise Name 4: marching glute bridge   Time 4: 15   Exercise 5  Exercise Name 5: sumo squat   Sets/Reps 5: 15 w/ 10# KB  Exercise 6  Exercise Name 6: HS stretch/nerve glides   Sets/Reps 6: 2  Exercise 7  Exercise Name 7: 0    Assessment & Plan     Assessment  Assessment details: Patient demonstrates fatigue with quadricep activation. His HS did not bother him much during treatment and this is attributed to the amount of stretching he performed prior to therapy. He was administered quad stretch for performance at home.     Plan  Plan details: Patient to continue with PT services to improve gait, balance, strength, transfers and overall functional mobility.          Timed:  Manual Therapy:            0     mins  03519;  Therapeutic Exercise:    40    mins  90548;     Neuromuscular Diomedes:    0    mins  50995;    Therapeutic Activity:      0     mins  12149;     Gait Trainin    mins  58164;     Electrical Stimulation:    0    mins  39692 ( );     Untimed:  Yenni  Repositioning techniques [id number]      Timed Treatment:   40   mins   Total Treatment:     40   mins      Pauly Huff PT, DPT, MSCS, CDP  [place] License #: [id number]  Physical Therapist

## 2020-12-08 ENCOUNTER — TREATMENT (OUTPATIENT)
Dept: PHYSICAL THERAPY | Facility: CLINIC | Age: 60
End: 2020-12-08

## 2020-12-08 DIAGNOSIS — R41.841 COGNITIVE COMMUNICATION DEFICIT: Primary | ICD-10-CM

## 2020-12-08 PROCEDURE — 92507 TX SP LANG VOICE COMM INDIV: CPT | Performed by: SPEECH-LANGUAGE PATHOLOGIST

## 2020-12-08 NOTE — PROGRESS NOTES
Outpatient Speech Language Pathology   Adult Speech Language Cognitive Treatment Note       Patient Name: Maury Mei  : 1960  MRN: [id number]  Today's Date: 2020         Visit Date: 2020   There is no problem list on file for this patient.         Visit Dx:    ICD-10-CM ICD-9-CM   1. Cognitive communication deficit  R41.841 799.52   Pain: 0  Subjective: Pt reports no new concerns   LTGs  Pt will be able to safely participate and attend to stimuli in all settings with 90% accuracy and no cues.   -initiated goals. Assessed daily tasks today. Medicine management: 85% with extra time- pt independent with checking over tasks. Appointment and finance management: 100%   Pt will be able to use high level cognitive skills to allow patient to return to work with 90% accuracy and no cues.    -Targeted high level attention tasks and went over strategies for return to work.   STGs  Pt will improve attention skills by sustaining focus to selective target/task when presented with competing stimuli or in a distracting environment in order to complete a task with 90% accuracy and minimal cues.   -Utilized distraction noises during auditory and visual recall tasks: 75%  Pt will improve attention skills by alternating or shifting focus between two different tasks in order to complete both tasks with 90% accuracy and minimal cues.   -Modeled and instructed on alternating focus strategies. 65% with model   Pt will improve attention skills by dividing focus and responding simultaneously to multiple tasks or in order to complete task with 90% accuracy and minimal cues.   Pt will improve executive functioning skills by using self-monitoring strategies during functional tasks with 90% accuracy and no cues.    -65% with model  Recertification: 2021            OP SLP Education     Row Name 20 1300       Education    Barriers to Learning  No barriers identified  -RB    Education Provided  Patient demonstrated  recommended strategies;Patient requires further education on strategies, risks  -RB    Assessed  Learning needs;Learning motivation;Learning preferences;Learning readiness  -RB    Learning Motivation  Strong  -RB    Learning Method  Explanation;Demonstration;Teach back;Written materials  -RB    Teaching Response  Verbalized understanding;Demonstrated understanding;Reinforcement needed  -RB    Education Comments  attention tasks and strategies   -RB      User Key  (r) = Recorded By, (t) = Taken By, (c) = Cosigned By    Initials Name Effective Dates    RB Darlyn Bull SLP 02/28/20 -           OP SLP Assessment/Plan - 12/08/20 1300        SLP Assessment    Functional Problems  Speech Language- Adult/Cognition   -RB    Impact on Function: Adult Speech Language/Cognition  Poor attention to task;Restrictions in personal and social life   -RB    Clinical Impression: Speech Language-Adult/Congnition  Mild:;Cognitive Communication Impairment   -RB    Functional Problems Comment  Pt's high level attention deficits impact daily functioning at home    -RB    Clinical Impression Comments  Pt benfits from skilled ST, would benefit from co treat with PT and OT    -RB    Please refer to paper survey for additional self-reported information  Yes   -RB    Please refer to items scanned into chart for additional diagnostic informaiton and handouts as provided by clinician  Yes   -RB    SLP Diagnosis  mild cognitive communication deficit    -RB    Prognosis  Good (comment)   -RB    Patient/caregiver participated in establishment of treatment plan and goals  Yes   -RB    Patient would benefit from skilled therapy intervention  Yes   -RB       SLP Plan    Frequency  1x/weekly   -RB    Duration  7 weeks   -RB    Planned CPT's?  SLP INDIVIDUAL SPEECH THERAPY: 02693   -RB    Expected Duration of Therapy Session (SLP Eval)  45   -RB    Plan Comments  continue POC    -RB      User Key  (r) = Recorded By, (t) = Taken By, (c) = Cosigned By     Initials Name Provider Type    Darlyn Kinsey SLP Speech and Language Pathologist                  Darlyn Bull MA CCC-SLP  12/8/2020

## 2020-12-14 ENCOUNTER — TREATMENT (OUTPATIENT)
Dept: PHYSICAL THERAPY | Facility: CLINIC | Age: 60
End: 2020-12-14

## 2020-12-14 DIAGNOSIS — Z74.09 IMPAIRED FUNCTIONAL MOBILITY, BALANCE, GAIT, AND ENDURANCE: ICD-10-CM

## 2020-12-14 DIAGNOSIS — I63.9 CEREBROVASCULAR ACCIDENT (CVA), UNSPECIFIED MECHANISM (HCC): Primary | ICD-10-CM

## 2020-12-14 PROCEDURE — 97112 NEUROMUSCULAR REEDUCATION: CPT | Performed by: PHYSICAL THERAPIST

## 2020-12-14 PROCEDURE — 97110 THERAPEUTIC EXERCISES: CPT | Performed by: PHYSICAL THERAPIST

## 2020-12-15 ENCOUNTER — TREATMENT (OUTPATIENT)
Dept: PHYSICAL THERAPY | Facility: CLINIC | Age: 60
End: 2020-12-15

## 2020-12-15 DIAGNOSIS — Z78.9 IMPAIRED MOBILITY AND ADLS: Primary | ICD-10-CM

## 2020-12-15 DIAGNOSIS — R41.841 COGNITIVE COMMUNICATION DEFICIT: Primary | ICD-10-CM

## 2020-12-15 DIAGNOSIS — Z74.09 IMPAIRED MOBILITY AND ADLS: Primary | ICD-10-CM

## 2020-12-15 DIAGNOSIS — R27.8 DECREASED COORDINATION: ICD-10-CM

## 2020-12-15 PROCEDURE — 97530 THERAPEUTIC ACTIVITIES: CPT | Performed by: OCCUPATIONAL THERAPIST

## 2020-12-15 PROCEDURE — 92507 TX SP LANG VOICE COMM INDIV: CPT | Performed by: SPEECH-LANGUAGE PATHOLOGIST

## 2020-12-15 PROCEDURE — 97110 THERAPEUTIC EXERCISES: CPT | Performed by: OCCUPATIONAL THERAPIST

## 2020-12-15 PROCEDURE — 97112 NEUROMUSCULAR REEDUCATION: CPT | Performed by: OCCUPATIONAL THERAPIST

## 2020-12-15 NOTE — PROGRESS NOTES
Outpatient Speech Language Pathology   Adult Speech Language Cognitive Treatment Note       Patient Name: Maury Mei  : 1960  MRN: [id number]  Today's Date: 12/15/2020         Visit Date: 12/15/2020   There is no problem list on file for this patient.         Visit Dx:    ICD-10-CM ICD-9-CM   1. Cognitive communication deficit  R41.841 799.52        Pain: 0  Subjective: Pt reports no new concerns, reports he drove for the first time yesterday   LTGs  Pt will be able to safely participate and attend to stimuli in all settings with 90% accuracy and no cues.   -Pt able to attend and utilize strategies throughout tasks at 85% with no cues   Pt will be able to use high level cognitive skills to allow patient to return to work with 90% accuracy and no cues.    -Targeted high level attention tasks and went over strategies for return to work. Able to utilize strategies at 80% with no cues    STGs  Pt will improve attention skills by sustaining focus to selective target/task when presented with competing stimuli or in a distracting environment in order to complete a task with 90% accuracy and minimal cues.   -Utilized distraction noises during auditory and visual recall tasks: 75%.  variety of distractions presented: (crowd noise, alternating tasks, podcasts, etc)  Pt will improve attention skills by alternating or shifting focus between two different tasks in order to complete both tasks with 90% accuracy and minimal cues.   -Modeled and instructed on alternating focus strategies. Pt able to alternate focus 70% of the time in a variety of high level tasks (budgeting, auditory recall, visual recall, editing) with model   Pt will improve attention skills by dividing focus and responding simultaneously to multiple tasks or in order to complete task with 90% accuracy and minimal cues.   Pt will improve executive functioning skills by using self-monitoring strategies during functional tasks with 90% accuracy and no  cues.    -Pt beckie to divide focus 75% of the time with no cues needed  Recertification: 02/28/2021            OP SLP Education     Row Name 12/15/20 1300       Education    Barriers to Learning  No barriers identified  -RB    Education Provided  Patient demonstrated recommended strategies;Patient requires further education on strategies, risks  -RB    Assessed  Learning needs;Learning motivation;Learning preferences;Learning readiness  -RB    Learning Motivation  Strong  -RB    Learning Method  Explanation;Demonstration;Teach back;Written materials  -RB    Teaching Response  Verbalized understanding;Demonstrated understanding;Reinforcement needed  -RB    Education Comments  high level budgeting, compare and contrast tasks, attention tasks   -RB      User Key  (r) = Recorded By, (t) = Taken By, (c) = Cosigned By    Initials Name Effective Dates    RB Darlyn Bull SLP 02/28/20 -           OP SLP Assessment/Plan - 12/15/20 1300        SLP Assessment    Functional Problems  Speech Language- Adult/Cognition   -RB    Impact on Function: Adult Speech Language/Cognition  Poor attention to task;Restrictions in personal and social life   -RB    Clinical Impression: Speech Language-Adult/Congnition  Mild:;Cognitive Communication Impairment   -RB    Functional Problems Comment  Pt's high level attention deficits are inhibiting his return to work related tasks.   -RB    Clinical Impression Comments  Pt is making gains towards goals and completing high level attention tasks with no cues needed. Able to perform tasks accurately even with distracting environments.    -RB    Please refer to paper survey for additional self-reported information  Yes   -RB    Please refer to items scanned into chart for additional diagnostic informaiton and handouts as provided by clinician  Yes   -RB    SLP Diagnosis  mild cognitive communication deficit    -RB    Prognosis  Good (comment)   -RB    Patient/caregiver participated in establishment  of treatment plan and goals  Yes   -RB    Patient would benefit from skilled therapy intervention  Yes   -RB       SLP Plan    Frequency  1x/weekly    -RB    Duration  6 weeks   -RB    Planned CPT's?  SLP INDIVIDUAL SPEECH THERAPY: 00197   -RB    Expected Duration of Therapy Session (SLP Eval)  45   -RB    Plan Comments  continue POC   -RB      User Key  (r) = Recorded By, (t) = Taken By, (c) = Cosigned By    Initials Name Provider Type    RB Darlyn Bull, SLP Speech and Language Pathologist              Darlyn Bull MA CCC-SLP  12/15/2020

## 2020-12-15 NOTE — PROGRESS NOTES
"OT Re-Assessment / Re-Certification        Patient: Maury Mei   : 1960  Diagnosis/ICD-10 Code:  Impaired mobility and ADLs [Z74.09, Z78.9]  Referring practitioner: Roxana Taylor DO  Date of Initial Visit: Type: THERAPY  Noted: 9/3/2020  Today's Date: 12/15/2020  Patient seen for 11 sessions      Subjective:   Patient reports: \"I've been working on all my exercises.\"  Pain: 0/10  Subjective Questionnaire: 9HPT R: 17.93 L: 20.41; improved speed, accuracy and translation with left, affected hand  Purdue Pegboard R: 15 L: 15 Row: 11 Assembly: 26  Clinical Progress: improved  Home Program Compliance: Yes  Treatment has included: therapeutic exercise and neuromuscular re-education    Subjective   Objective     OT Neuro         Assessment & Plan     Assessment  Impairments: abnormal coordination, activity intolerance, impaired balance, impaired physical strength and lacks appropriate home exercise program  Assessment details: OT re-assessment completed per POC. Pt exhibited improved speed with FMC tests along w/ improved accuracy & tranlsation with movements. Pt reports improved ability to type with better accuracy and speed. Pt continues to have most difficulty with dual/multi-tasking w/balance, UE strengthening and cog load. Recommend continuation of skilled OT services to address these areas and focus on LUE ROM, strength, FMC/GMC, activity tolerance and full return to PLOF and work/IADL performance.        Prognosis: good  Functional Limitations: carrying objects, lifting, walking, pulling, pushing, standing, stooping, reaching overhead and unable to perform repetitive tasks  Goals  Plan Goals:   Pt will score 26 seconds or less L HAND on the 9HPT to demonstrate improved accuracy and speed with fine motor coordination by 8 wks. MET       Pt will complete dynamic standing activity X 10 mins with independent support while incorporating BUE's to simulate ADL/IADL tasks by 8 wks. MET     Pt will be " independent with L hand strengthening HEP to increase independence with ADL/IADL performance tasks by 4 wks. MET    Pt will be independent with L hand FMC HEP to increase independence with ADL/IADL performance tasks by 4 wks. MET    Pt will be independent with LUE strengthening HEP to aid with return to work/IADL function by d/c. NEW GOAL ADDED 10/13/2020; PROGRESSING DIFFICULTY         Plan  Planned therapy interventions: ADL retraining, balance/weight-bearing training, fine motor coordination training, flexibility, functional ROM exercises, home exercise program, IADL retraining, motor coordination training, neuromuscular re-education, postural training, strengthening, stretching, therapeutic activities and transfer training  Frequency: 1x week  Duration in weeks: 4  Treatment plan discussed with: patient  Plan details: Continue w/skilled OT POC and goals to reflect above deficits and promote increased independence, safety and engagement in daily tasks.       OT Exercises     Row Name 12/15/20 1345             Precautions    Existing Precautions/Restrictions  fall  -ST         Exercise 1    Exercise Name 1  neural priming with activity tolerance building/balance and strengthening using elliptical forward only   -ST      Time (Minutes) 1  7  -ST         Exercise 2    Exercise Name 2  OT re-assessment completed per POC  -ST         Exercise 3    Exercise Name 3  stepping forward/backward on balance foam beam while holding yellow med ball and moving up/down while performing cog load  -ST         Exercise 4    Exercise Name 4  grapevine along foam balance beam holding yellow med ball OH w/cog load   -ST         Exercise 5    Exercise Name 5  standing on flat portion of 1/2 FR ALT LUE OH/CP  -ST      Sets 5  2  -ST      Reps 5  10  -ST         Exercise 6    Exercise Name 6  SL mini squats holding yellow med ball in elbow ext  -ST      Cueing 6  -- ALT R/L LE's  -ST      Sets 6  2  -ST      Reps 6  5  -ST          Exercise 7    Exercise Name 7  side planks, ALT R/L sides for increasing L shoulder stabilization, strength and overall core stability and balance   -        User Key  (r) = Recorded By, (t) = Taken By, (c) = Cosigned By    Initials Name Provider Type    Velvet Velasco, OTR Occupational Therapist          Visit Diagnoses:    ICD-10-CM ICD-9-CM   1. Impaired mobility and ADLs  Z74.09 V49.89    Z78.9    2. Decreased coordination  R27.8 781.3       Progress toward previous goals: Partially Met      Recommendations: Continue as planned  Timeframe: 1 month  Prognosis to achieve goals: good    OT Signature: Velvet Hanson MS, OTR/L, [facility]  [place] License #: [id number]    Based upon review of the patient's progress and continued therapy plan, it is my medical opinion that Maury Mei should continue occupational therapy treatment at [facility]  [address] [place] [id number] [phone].    Signature: __________________________________  Roxana Taylor DO    Timed:  Manual Therapy:    0     mins  22802;  Therapeutic Exercise:    15     mins  15073;     Neuromuscular Diomedes:    21    mins  62141;    Therapeutic Activity:     12     mins  01884;     Self-Care/ADL     0     mins  55516;   Sensory Int. Tech      0     mins 48352;  Ultrasound:     0     mins  61754;    Electrical Stimulation:    0     mins  16562 ( );    Untimed:  Electrical Stimulation:    0     mins  20413 ( );    Timed Treatment:   48   mins   Total Treatment:     48   mins

## 2020-12-15 NOTE — PROGRESS NOTES
Physical Therapy Daily Progress Note  Visit: 14  Date of Initial Visit: Type: THERAPY  Noted: 2020    Patient: Maury Mei   : 1960  Diagnosis/ICD-10 Code:  Cerebrovascular accident (CVA), unspecified mechanism (CMS/HCC) [I63.9]  Referring practitioner: No Known Provider  Date of Initial Visit: Type: THERAPY  Noted: 2020  Today's Date: 2020  Patient seen for 14 sessions      Subjective:   Patient reports: he stretched before coming to therapy.   Pain: 0/10  Clinical Progress: improved  Home Program Compliance: Yes  Treatment has included: therapeutic exercise and neuromuscular re-education    Objective   See Exercise, Manual, and Modality Logs for complete treatment.    PT Neuro          Assessment & Plan     Assessment  Assessment details: Patient demonstrates weakness throughout B quads and hips. He will continue to require strengthening in these areas with balance training as well. Patient will continue to be progressed as indicated.     Plan  Plan details: Patient to continue with PT services to improve gait, balance, strength, transfers and overall functional mobility.          Timed:  Manual Therapy:            0     mins  91477;  Therapeutic Exercise:    25    mins  11242;     Neuromuscular Diomedes:    20    mins  94910;    Therapeutic Activity:      0     mins  46451;     Gait Trainin    mins  36025;     Electrical Stimulation:    0    mins  35252 ( );     Untimed:  Canalith Repositioning techniques _0_ [id number]      Timed Treatment:   45   mins   Total Treatment:     45   mins      Pauly Huff PT, DPT, MSCS, CDP  [place] License #: [id number]  Physical Therapist

## 2020-12-21 ENCOUNTER — TREATMENT (OUTPATIENT)
Dept: PHYSICAL THERAPY | Facility: CLINIC | Age: 60
End: 2020-12-21

## 2020-12-21 DIAGNOSIS — Z74.09 IMPAIRED FUNCTIONAL MOBILITY, BALANCE, GAIT, AND ENDURANCE: ICD-10-CM

## 2020-12-21 DIAGNOSIS — I63.9 CEREBROVASCULAR ACCIDENT (CVA), UNSPECIFIED MECHANISM (HCC): Primary | ICD-10-CM

## 2020-12-21 PROCEDURE — 97112 NEUROMUSCULAR REEDUCATION: CPT | Performed by: PHYSICAL THERAPIST

## 2020-12-21 PROCEDURE — 97110 THERAPEUTIC EXERCISES: CPT | Performed by: PHYSICAL THERAPIST

## 2020-12-21 NOTE — PROGRESS NOTES
Physical Therapy Daily Progress Note  Visit: 15  Date of Initial Visit: Type: THERAPY  Noted: 2020    Patient: Maury Mei   : 1960  Diagnosis/ICD-10 Code:  Cerebrovascular accident (CVA), unspecified mechanism (CMS/HCC) [I63.9]  Referring practitioner: No Known Provider  Date of Initial Visit: Type: THERAPY  Noted: 2020  Today's Date: 2020  Patient seen for 15 sessions      Subjective:   Patient reports: the elliptical and leg press is getting easier at the gym.  Pain: 0/10  Clinical Progress: improved  Home Program Compliance: Yes  Treatment has included: therapeutic exercise and neuromuscular re-education    Objective   See Exercise, Manual, and Modality Logs for complete treatment.    PT Neuro   Exercise 1  Exercise Name 1: Elliptical   Equipment/Resistance 1: L1  Time: 8 min   Exercise 2  Exercise Name 2: calf stretch   Sets/Reps 2: 2  Time 2: 30 sec   Exercise 3  Exercise Name 3: squats in front of mirror for form  Sets/Reps 3: 5  Exercise 4  Exercise Name 4: squats on flat side bosu - mini   Sets/Reps 4: 15  Exercise 5  Exercise Name 5: previous exercise with added 3# bar overhead   Sets/Reps 5: 15   Exercise 6  Exercise Name 6: forward step up with knee lift holding bar   Equipment/Resistance 6: BOSU - round side   Sets/Reps 6: 20  Exercise 7  Exercise Name 7: attempted high steps on box - challenging difficulty lifting L leg   Sets/Reps 7: > 20                   Assessment & Plan     Assessment  Assessment details: Patient with difficulty performing quick speed leg lift and determining where to place foot on box. He was instructed to practice at home to improve the depth perception.     Plan  Plan details: Patient to continue with PT services to improve gait, balance, strength, transfers and overall functional mobility.          Timed:  Manual Therapy:            0     mins  12143;  Therapeutic Exercise:    8    mins  89461;     Neuromuscular Diomedes:    32    mins  96526;     Therapeutic Activity:      0     mins  72868;     Gait Trainin    mins  05262;     Electrical Stimulation:    0    mins  44772 ( );     Untimed:  Canalith Repositioning techniques _0_ [id number]      Timed Treatment:   40   mins   Total Treatment:     40   mins      Pauly Huff PT, DPT, MSCS, CDP  [place] License #: [id number]  Physical Therapist

## 2020-12-22 ENCOUNTER — TREATMENT (OUTPATIENT)
Dept: PHYSICAL THERAPY | Facility: CLINIC | Age: 60
End: 2020-12-22

## 2020-12-22 DIAGNOSIS — Z78.9 IMPAIRED MOBILITY AND ADLS: Primary | ICD-10-CM

## 2020-12-22 DIAGNOSIS — R41.841 COGNITIVE COMMUNICATION DEFICIT: Primary | ICD-10-CM

## 2020-12-22 DIAGNOSIS — R27.8 DECREASED COORDINATION: ICD-10-CM

## 2020-12-22 DIAGNOSIS — Z74.09 IMPAIRED MOBILITY AND ADLS: Primary | ICD-10-CM

## 2020-12-22 PROCEDURE — 97530 THERAPEUTIC ACTIVITIES: CPT | Performed by: OCCUPATIONAL THERAPIST

## 2020-12-22 PROCEDURE — 97112 NEUROMUSCULAR REEDUCATION: CPT | Performed by: OCCUPATIONAL THERAPIST

## 2020-12-22 PROCEDURE — 92507 TX SP LANG VOICE COMM INDIV: CPT | Performed by: SPEECH-LANGUAGE PATHOLOGIST

## 2020-12-22 PROCEDURE — 97110 THERAPEUTIC EXERCISES: CPT | Performed by: OCCUPATIONAL THERAPIST

## 2020-12-22 NOTE — PROGRESS NOTES
Outpatient Speech Language Pathology   Adult Speech Language Cognitive Treatment Note       Patient Name: Maury Mei  : 1960  MRN: [id number]  Today's Date: 2020         Visit Date: 2020   There is no problem list on file for this patient.         Visit Dx:    ICD-10-CM ICD-9-CM   1. Cognitive communication deficit  R41.841 799.52     Pain: 0  Subjective: Pt reports no new concerns, feels he is improving   LTGs  Pt will be able to safely participate and attend to stimuli in all settings with 90% accuracy and no cues.   -Pt able to attend and utilize strategies throughout tasks at 85% with no cues   Pt will be able to use high level cognitive skills to allow patient to return to work with 90% accuracy and no cues.    -Targeted high level attention tasks and went over strategies for return to work. Able to utilize strategies at 85% with no cues    STGs  Pt will improve attention skills by sustaining focus to selective target/task when presented with competing stimuli or in a distracting environment in order to complete a task with 90% accuracy and minimal cues.   -Utilized distraction noises during auditory and visual recall tasks: 80%.  variety of distractions presented: (crowd noise, alternating tasks, podcasts, etc)  Pt will improve attention skills by alternating or shifting focus between two different tasks in order to complete both tasks with 90% accuracy and minimal cues.   -Modeled and instructed on alternating focus strategies. Pt able to alternate focus 80% of the time in a variety of high level tasks (budgeting, money counting, auditory recall, visual recall) with model   Pt will improve attention skills by dividing focus and responding simultaneously to multiple tasks or in order to complete task with 90% accuracy and minimal cues.   Pt will improve executive functioning skills by using self-monitoring strategies during functional tasks with 90% accuracy and no cues.    -Pt beckie to  divide focus 80% of the time with no cues needed  Recertification: 02/28/2021          OP SLP Education     Row Name 12/22/20 1500       Education    Barriers to Learning  No barriers identified  -RB    Education Provided  Patient demonstrated recommended strategies;Patient requires further education on strategies, risks  -RB    Assessed  Learning needs;Learning motivation;Learning preferences;Learning readiness  -RB    Learning Motivation  Strong  -RB    Learning Method  Explanation;Demonstration;Teach back;Written materials  -RB    Teaching Response  Verbalized understanding;Demonstrated understanding;Reinforcement needed  -RB    Education Comments  high level financial mangement task   -RB      User Key  (r) = Recorded By, (t) = Taken By, (c) = Cosigned By    Initials Name Effective Dates    RB Darlyn Bull SLP 02/28/20 -           OP SLP Assessment/Plan - 12/22/20 1500        SLP Assessment    Functional Problems  Speech Language- Adult/Cognition   -RB    Impact on Function: Adult Speech Language/Cognition  Poor attention to task;Restrictions in personal and social life   -RB    Clinical Impression: Speech Language-Adult/Congnition  Mild:;Cognitive Communication Impairment   -RB    Functional Problems Comment  Pt's attention deficits impact daily functioning   -RB    Clinical Impression Comments  Pt making gains towards goals, would benefit from co treat with PT and OT    -RB    Please refer to paper survey for additional self-reported information  Yes   -RB    Please refer to items scanned into chart for additional diagnostic informaiton and handouts as provided by clinician  Yes   -RB    SLP Diagnosis  mild cognitive communication deficit    -RB    Prognosis  Good (comment)   -RB    Patient/caregiver participated in establishment of treatment plan and goals  Yes   -RB    Patient would benefit from skilled therapy intervention  Yes   -RB       SLP Plan    Frequency  1x/weekly   -RB    Duration  5 weeks   -RB     Planned CPT's?  SLP INDIVIDUAL SPEECH THERAPY: 03428   -RB    Expected Duration of Therapy Session (SLP Eval)  45   -RB    Plan Comments  continue POC   -RB      User Key  (r) = Recorded By, (t) = Taken By, (c) = Cosigned By    Initials Name Provider Type    Darlyn Kinsey SLP Speech and Language Pathologist                Darlyn Bull MA CCC-SLP  12/22/2020

## 2020-12-22 NOTE — PROGRESS NOTES
Occupational Therapy Daily Progress Note  Visit: 12  Date of Initial Visit: Type: THERAPY  Noted: 9/3/2020      Patient: Maury Mie   : 1960  Diagnosis/ICD-10 Code:  Impaired mobility and ADLs [Z74.09, Z78.9]  Referring practitioner: Roxana Taylor DO  Date of Initial Visit: Type: THERAPY  Noted: 9/3/2020  Today's Date: 2020  Patient seen for 12 sessions      Subjective:   Patient reports: no complaints  Pain: 0/10  Subjective Questionnaire: n/a  Clinical Progress: improved  Home Program Compliance: Yes  Treatment has included: therapeutic exercise, neuromuscular re-education and therapeutic activity    Subjective   Objective     OT Neuro         OT Exercises     Row Name 20 1345             Precautions    Existing Precautions/Restrictions  fall  -ST         Exercise 1    Exercise Name 1  neural priming with activity tolerance building/balance and strengthening using elliptical forward only   -ST      Time (Minutes) 1  6  -ST         Exercise 2    Exercise Name 2  plank X5  -ST      Time (Seconds) 2  12  -ST         Exercise 3    Exercise Name 3  side plank R/L X3 each  -ST      Sets 3  1  -ST      Time (Seconds) 3  5  -ST         Exercise 4    Exercise Name 4  plank while LE's on therapy ball for increased stabilization   -ST      Time (Seconds) 4  20  -ST         Exercise 5    Exercise Name 5  isolated protraction/retraction, unable to perform with wt and required to complete standing at wall in parallel position, cuing for straight L elbow   -ST         Exercise 6    Exercise Name 6  standing on foam balance beam ALT R/L LE tapping cone while holding 4# med ball   -ST         Exercise 7    Exercise Name 7  standing on air ex ALT OH press with CP 4# med ball cone tapping in hip abd/adduction   -ST      Cueing 7  -- several mild instances LOB but able to recover   -ST        User Key  (r) = Recorded By, (t) = Taken By, (c) = Cosigned By    Initials Name Provider Type    Velvet Velasco  ARMIDA, OTR Occupational Therapist           Assessment & Plan     Assessment  Assessment details: Pt with difficulty performing isolated scapular retraction and protraction with wt, demonstrating inability to maintain straight L elbow from weakness when in plank or modified plank. Required to stand parallel to wall to complete with overpressure at L elbow.     Plan  Plan details: Continue w/OT POC and goals as est.         Visit Diagnoses:    ICD-10-CM ICD-9-CM   1. Impaired mobility and ADLs  Z74.09 V49.89    Z78.9    2. Decreased coordination  R27.8 781.3             Timed:  Manual Therapy:    0     mins  61424;  Therapeutic Exercise:    15     mins  73857;     Neuromuscular Diomedes:    15    mins  00160;    Therapeutic Activity:     15     mins  43709;     Self-Care/ADL     0     mins  53938;   Sensory Int. Tech      0     mins 95216;  Ultrasound:     0     mins  95336;    Electrical Stimulation:    0     mins  95209 ( );    Untimed:  Electrical Stimulation:    0     mins  23522 ( );    Timed Treatment:   45   mins   Total Treatment:     45   mins    OT Signature: Velvet Hanson MS, OTR/L, CDP  [place] License #: [id number]

## 2020-12-31 ENCOUNTER — TREATMENT (OUTPATIENT)
Dept: PHYSICAL THERAPY | Facility: CLINIC | Age: 60
End: 2020-12-31

## 2020-12-31 DIAGNOSIS — Z74.09 IMPAIRED FUNCTIONAL MOBILITY, BALANCE, GAIT, AND ENDURANCE: ICD-10-CM

## 2020-12-31 DIAGNOSIS — Z78.9 IMPAIRED MOBILITY AND ADLS: Primary | ICD-10-CM

## 2020-12-31 DIAGNOSIS — I63.9 CEREBROVASCULAR ACCIDENT (CVA), UNSPECIFIED MECHANISM (HCC): Primary | ICD-10-CM

## 2020-12-31 DIAGNOSIS — R41.841 COGNITIVE COMMUNICATION DEFICIT: Primary | ICD-10-CM

## 2020-12-31 DIAGNOSIS — Z74.09 IMPAIRED MOBILITY AND ADLS: Primary | ICD-10-CM

## 2020-12-31 DIAGNOSIS — R27.8 DECREASED COORDINATION: ICD-10-CM

## 2020-12-31 PROCEDURE — 97116 GAIT TRAINING THERAPY: CPT | Performed by: PHYSICAL THERAPIST

## 2020-12-31 PROCEDURE — 97110 THERAPEUTIC EXERCISES: CPT | Performed by: OCCUPATIONAL THERAPIST

## 2020-12-31 PROCEDURE — 92507 TX SP LANG VOICE COMM INDIV: CPT | Performed by: SPEECH-LANGUAGE PATHOLOGIST

## 2020-12-31 PROCEDURE — 97112 NEUROMUSCULAR REEDUCATION: CPT | Performed by: PHYSICAL THERAPIST

## 2020-12-31 PROCEDURE — 97530 THERAPEUTIC ACTIVITIES: CPT | Performed by: OCCUPATIONAL THERAPIST

## 2020-12-31 NOTE — PROGRESS NOTES
"Occupational Therapy Daily Progress Note  Visit: 13  Date of Initial Visit: Type: THERAPY  Noted: 9/3/2020      Patient: Maury Mei   : 1960  Diagnosis/ICD-10 Code:  Impaired mobility and ADLs [Z74.09, Z78.9]  Referring practitioner: Roxana Taylor DO  Date of Initial Visit: Type: THERAPY  Noted: 9/3/2020  Today's Date: 2020  Patient seen for 13 sessions      Subjective:   Patient reports: \"I'm doing well today\"  Pain: 0/10  Subjective Questionnaire: n/a  Clinical Progress: improved  Home Program Compliance: Yes  Treatment has included: therapeutic exercise and therapeutic activity    Subjective   Objective     OT Neuro         OT Exercises     Row Name 20 1345             Precautions    Existing Precautions/Restrictions  fall  -ST         Exercise 1    Exercise Name 1  neural priming with UE strengthening UBE L6  -ST      Time (Minutes) 1  6.0  -ST         Exercise 2    Exercise Name 2  modified plank X5  -ST      Time (Seconds) 2  12  -ST         Exercise 3    Exercise Name 3  plank w/BLE's on therapy ball  -ST      Reps 3  4  -ST      Time (Seconds) 3  5  -ST         Exercise 4    Exercise Name 4  protraction/retraction against wall  -ST         Exercise 5    Exercise Name 5  OH press w/shoulder abd/adduction   -ST      Equipment 5  Dumbell  -ST      Weights/Plates 5  2  -ST      Sets 5  2  -ST      Reps 5  10  -ST         Exercise 6    Exercise Name 6  modified push-ups  -ST      Sets 6  2  -ST      Reps 6  5  -ST        User Key  (r) = Recorded By, (t) = Taken By, (c) = Cosigned By    Initials Name Provider Type    Velvet Velasco OTR Occupational Therapist           Assessment & Plan     Assessment  Assessment details: Pt continues to have difficulty differentiating protration/retraction w/resistance d/t weakness in LUE but with Saxman A at elbow and additional cuing, able to improve technique. Pt has difficulty maintaining plank while activating core and maintaining balance on " therapy ball. Pt bilaterally very fatigued with UE's and required to decreased weight with OH press dumb bell exercises.    Plan  Plan details: Continue with POC and goals w/focus on strengthening, coordination, balance and activity tolerance.         Visit Diagnoses:    ICD-10-CM ICD-9-CM   1. Impaired mobility and ADLs  Z74.09 V49.89    Z78.9    2. Decreased coordination  R27.8 781.3             Timed:  Manual Therapy:    0     mins  21043;  Therapeutic Exercise:    30     mins  83281;     Neuromuscular Diomedes:    0    mins  15128;    Therapeutic Activity:     10     mins  33164;     Self-Care/ADL     0     mins  07052;   Sensory Int. Tech      0     mins 36499;  Ultrasound:     0     mins  88024;    Electrical Stimulation:    0     mins  56916 ( );    Untimed:  Electrical Stimulation:    0     mins  49886 ( );    Timed Treatment:   40   mins   Total Treatment:     40   mins    OT Signature: Velvet Hanson MS, OTR/L, CDP  [place] License #: [id number]

## 2020-12-31 NOTE — PROGRESS NOTES
PT Re-Assessment / Re-Certification  Visit: 16  Date of Initial Visit: Type: THERAPY  Noted: 2020    Patient: Maury Mei   : 1960  Diagnosis/ICD-10 Code:  Cerebrovascular accident (CVA), unspecified mechanism (CMS/HCC) [I63.9]  Referring practitioner: No Known Provider  Date of Initial Visit: Type: THERAPY  Noted: 2020  Today's Date: 2020  Patient seen for 16 sessions      Subjective:   Patient reports: he is feeling decent.   Pain: 0/10  Clinical Progress: improved  Home Program Compliance: Yes  Treatment has included: therapeutic exercise and neuromuscular re-education    Objective   See Exercise, Manual, and Modality Logs for complete treatment.    PT Neuro         Assessment & Plan     Assessment  Impairments: abnormal coordination, abnormal gait, activity intolerance, impaired balance, impaired physical strength and safety issue  Assessment details: Patient continues to make gains with function and strength. However, his gait is still slightly impaired due to pelvic malalignment which is causing R foot to scuff. Step length and heel strike are difficult to achieve. Patient will be seen by MD next week for hopeful return to work. Patient will continue to be seen for strengthening and balance re-education.   Prognosis: good  Functional Limitations: carrying objects, walking, standing and stooping  Goals  Plan Goals: STG (6 visits)  1. Patient will report compliance with initial HEP. MET  2. Patient to perform TUG within 9 sec without LOB for improved functional mobility.MET  3. Patient to ambulate 10 meters without AD within 9 sec without LOB for improved       gait renard and functional mobility. MET  4. Patient to improve FGA score to >/= 27 /30 to decrease client's risk of falls. MET  5. Patient to improve mini-BEST test balance score to >/= 25/28 to decrease risk of falls. MET    LTG (12 visits)  1. Patient will be I with final HEP. ONGOING  2. Patient to perform TUG within 8 sec  without LOB for improved functional mobility. ONGOING  3. Patient to ambulate 10 meters without AD within 8 sec without LOB for improved gait renard and functional mobility.ONGOING  4. Patient to improve FGA score to >/= 30/30 to decrease client's risk of falls.MET  5. Patient to improve mini-BEST test balance score to >/= 28/28 to decrease client's risk of falls. ONGOING        Plan  Therapy options: will be seen for skilled physical therapy services  Planned modality interventions: TENS  Planned therapy interventions: ADL retraining, balance/weight-bearing training, flexibility, gait training, manual therapy, neuromuscular re-education, motor coordination training, postural training, strengthening, stretching, therapeutic activities, transfer training and home exercise program  Frequency: 1x week  Duration in visits: 4  Treatment plan discussed with: patient and caregiver  Plan details: Patient will be seen 1x/wk x 4wks with treatment to include strengthening, stretching, manual therapy, neuromuscular re-education, balance, gait and endurance training.           Visit Diagnoses:    ICD-10-CM ICD-9-CM   1. Cerebrovascular accident (CVA), unspecified mechanism (CMS/[facility])  I63.9 434.91   2. Impaired functional mobility, balance, gait, and endurance  Z74.09 V49.89       Progress toward previous goals: Partially Met      Recommendations: Continue as planned  Timeframe: 1 month    PT Signature: Pauly Huff, PT, DPT, MSCS, CDP  [place] License #: [id number]    Based upon review of the patient's progress and continued therapy plan, it is my medical opinion that Maury Mei should continue physical therapy treatment at [facility] TH[facility]  [address] [place] [id number] [phone].    Signature: __________________________________  Provider, No Known    Timed:  Manual Therapy:    0     mins  93401;  Therapeutic Exercise:    0     mins  70036;     Neuromuscular Diomedes:    20     mins  77305;    Therapeutic Activity:     0     mins  68368;     Gait Training:      10     mins  25654;     Electrical Stimulation:    0     mins  01130 ( );    Untimed:  Canalith Repositioning   0 mins  57443    Timed Treatment:   30   mins  (only billing for 2 units due to co-treat with speech therapist)   Total Treatment:     45   mins

## 2020-12-31 NOTE — PROGRESS NOTES
Outpatient Speech Language Pathology   Adult Speech Language Cognitive Progress Note       Patient Name: Maury Mei  : 1960  MRN: [id number]  Today's Date: 2020         Visit Date: 2020   There is no problem list on file for this patient.         Visit Dx:    ICD-10-CM ICD-9-CM   1. Cognitive communication deficit  R41.841 799.52        Pain: 0  Subjective: Pt reports no new concerns, feels he is improving. Pt may be cleared to work next week.   LTGs  Pt will be able to safely participate and attend to stimuli in all settings with 90% accuracy and no cues.   -Pt able to attend and utilize strategies throughout tasks at 90% with no cues   Pt will be able to use high level cognitive skills to allow patient to return to work with 90% accuracy and no cues.    -Targeted high level attention tasks and went over strategies for return to work. Able to utilize strategies at 90% with no cues    STGs  Pt will improve attention skills by sustaining focus to selective target/task when presented with competing stimuli or in a distracting environment in order to complete a task with 90% accuracy and minimal cues.   -Utilized distractions (noises, conversation, etc) during PT tasks: 80%.  variety of distractions presented: (crowd noise, alternating tasks, performing both PT and ST tasks)  Pt will improve attention skills by alternating or shifting focus between two different tasks in order to complete both tasks with 90% accuracy and minimal cues.   -Modeled and instructed on alternating focus strategies. Pt able to alternate focus between PT and ST tasks  85% of the time in a variety of high level tasks  Pt will improve attention skills by dividing focus and responding simultaneously to multiple tasks or in order to complete task with 90% accuracy and minimal cues.   Pt will improve executive functioning skills by using self-monitoring strategies during functional tasks with 90% accuracy and no cues.    -Pt  able to divide focus 85% of the time with no cues needed  Recertification: 02/28/2021      Pt seen as a partial co treat with PT to work on multi tasking and high level attention skills. Benefits from co treat.       OP SLP Education     Row Name 12/31/20 1500       Education    Barriers to Learning  No barriers identified  -RB    Education Provided  Patient demonstrated recommended strategies;Patient requires further education on strategies, risks  -RB    Assessed  Learning needs;Learning motivation;Learning preferences;Learning readiness  -RB    Learning Motivation  Strong  -RB    Learning Method  Explanation;Demonstration;Teach back;Written materials  -RB    Teaching Response  Verbalized understanding;Demonstrated understanding;Reinforcement needed  -RB    Education Comments  return to work sheet from [employer]   -RB      User Key  (r) = Recorded By, (t) = Taken By, (c) = Cosigned By    Initials Name Effective Dates    Darlyn Kinsey SLP 02/28/20 -           OP SLP Assessment/Plan - 12/31/20 1500        SLP Assessment    Functional Problems  Speech Language- Adult/Cognition   -RB    Impact on Function: Adult Speech Language/Cognition  Poor attention to task;Restrictions in personal and social life   -RB    Clinical Impression: Speech Language-Adult/Congnition  Mild:;Cognitive Communication Impairment   -RB    Functional Problems Comment  Pt's attention deficits could impact his performance of job tasks when he returns to work   -RB    Clinical Impression Comments  Benefits from co treat for multi tasking and high level tasks. Pt making gains and completing high level attention tasks. Benefits from skilled ST   -RB    Please refer to paper survey for additional self-reported information  Yes   -RB    Please refer to items scanned into chart for additional diagnostic informaiton and handouts as provided by clinician  Yes   -RB    SLP Diagnosis  mild cognitive communication deficit    -RB     Prognosis  Good (comment)   -RB    Patient/caregiver participated in establishment of treatment plan and goals  Yes   -RB    Patient would benefit from skilled therapy intervention  Yes   -RB       SLP Plan    Frequency  1x/weekly   -RB    Duration  4 weeks   -RB    Planned CPT's?  SLP INDIVIDUAL SPEECH THERAPY: 98424   -RB    Expected Duration of Therapy Session (SLP Eval)  45   -RB    Plan Comments  continue POC, co treat    -RB      User Key  (r) = Recorded By, (t) = Taken By, (c) = Cosigned By    Initials Name Provider Type    Darlyn Kinsey, SLP Speech and Language Pathologist             SLP Outcome Measures (last 72 hours)      SLP Outcome Measures     Row Name 12/31/20 1500             Adult FCM Scores    Attention FCM Score  6  -RB        User Key  (r) = Recorded By, (t) = Taken By, (c) = Cosigned By    Initials Name Effective Dates    Darlyn Kinsey SLP 02/28/20 -                 RAMAN Price  12/31/2020

## 2021-01-07 ENCOUNTER — TREATMENT (OUTPATIENT)
Dept: PHYSICAL THERAPY | Facility: CLINIC | Age: 61
End: 2021-01-07

## 2021-01-07 DIAGNOSIS — R27.8 DECREASED COORDINATION: ICD-10-CM

## 2021-01-07 DIAGNOSIS — R41.841 COGNITIVE COMMUNICATION DEFICIT: Primary | ICD-10-CM

## 2021-01-07 DIAGNOSIS — Z74.09 IMPAIRED MOBILITY AND ADLS: Primary | ICD-10-CM

## 2021-01-07 DIAGNOSIS — Z78.9 IMPAIRED MOBILITY AND ADLS: Primary | ICD-10-CM

## 2021-01-07 PROCEDURE — 97112 NEUROMUSCULAR REEDUCATION: CPT | Performed by: OCCUPATIONAL THERAPIST

## 2021-01-07 PROCEDURE — 97110 THERAPEUTIC EXERCISES: CPT | Performed by: OCCUPATIONAL THERAPIST

## 2021-01-07 PROCEDURE — 97530 THERAPEUTIC ACTIVITIES: CPT | Performed by: OCCUPATIONAL THERAPIST

## 2021-01-07 PROCEDURE — 92507 TX SP LANG VOICE COMM INDIV: CPT | Performed by: SPEECH-LANGUAGE PATHOLOGIST

## 2021-01-07 NOTE — PROGRESS NOTES
Outpatient Speech Language Pathology   Adult Speech Language Cognitive Treatment Note       Patient Name: Maury Mei  : 1960  MRN: [id number]  Today's Date: 2021         Visit Date: 2021   There is no problem list on file for this patient.         Visit Dx:    ICD-10-CM ICD-9-CM   1. Cognitive communication deficit  R41.841 799.52        Pain: 0  Subjective:Pt cleared to work starting Monday  LTGs  Pt will be able to safely participate and attend to stimuli in all settings with 90% accuracy and no cues.   -Pt able to attend and utilize strategies throughout tasks at 90% with no cues   Pt will be able to use high level cognitive skills to allow patient to return to work with 90% accuracy and no cues.    -Targeted high level attention and multi tasking tasks and went over strategies for return to work. Able to utilize strategies at 90% with no cues    STGs  Pt will improve attention skills by sustaining focus to selective target/task when presented with competing stimuli or in a distracting environment in order to complete a task with 90% accuracy and minimal cues.   -Utilized distractions (noises, conversation, work related recall, etc) during OT tasks: 85%.  variety of distractions presented: (crowd noise, alternating tasks, performing both OT and ST tasks)  Pt will improve attention skills by alternating or shifting focus between two different tasks in order to complete both tasks with 90% accuracy and minimal cues.   -Modeled and instructed on alternating focus strategies. Pt able to alternate focus between OT and ST tasks  85% of the time in a variety of high level tasks. Pt had to complete OT exercises while recalling work related functional information.   Pt will improve attention skills by dividing focus and responding simultaneously to multiple tasks or in order to complete task with 90% accuracy and minimal cues.   Pt will improve executive functioning skills by using self-monitoring  strategies during functional tasks with 90% accuracy and no cues.    -Pt able to divide focus 85% of the time with no cues needed  Recertification: 02/28/2021  Pt seen as a partial co treat with OT to work on multi tasking and high level attention skills. Benefits from co treat.             OP SLP Education     Row Name 01/07/21 1400       Education    Barriers to Learning  No barriers identified  -RB    Education Provided  Patient demonstrated recommended strategies;Patient requires further education on strategies, risks  -RB    Assessed  Learning needs;Learning motivation;Learning preferences;Learning readiness  -RB    Learning Motivation  Strong  -RB    Learning Method  Explanation;Demonstration;Teach back  -RB    Teaching Response  Verbalized understanding  -RB    Education Comments  return to work education, attention strategies   -RB      User Key  (r) = Recorded By, (t) = Taken By, (c) = Cosigned By    Initials Name Effective Dates    Darlyn Kinsey SLP 02/28/20 -           OP SLP Assessment/Plan - 01/07/21 1400        SLP Assessment    Functional Problems  Speech Language- Adult/Cognition   -RB    Impact on Function: Adult Speech Language/Cognition  Poor attention to task;Restrictions in personal and social life   -RB    Clinical Impression: Speech Language-Adult/Congnition  Mild:;Cognitive Communication Impairment   -RB    Functional Problems Comment  Pt's sttention deficits impact his daily functions. Preparign to return to work    -RB    Clinical Impression Comments  Benefits from co treat. Making gains towards goals, following HEP    -RB    Please refer to paper survey for additional self-reported information  Yes   -RB    Please refer to items scanned into chart for additional diagnostic informaiton and handouts as provided by clinician  Yes   -RB    SLP Diagnosis  mild cognitive communication deficit    -RB    Prognosis  Good (comment)   -RB    Patient/caregiver participated in establishment of  treatment plan and goals  Yes   -RB    Patient would benefit from skilled therapy intervention  Yes   -RB       SLP Plan    Frequency  1x/weekly   -RB    Duration  3 weeks   -RB    Planned CPT's?  SLP INDIVIDUAL SPEECH THERAPY: 75740   -RB    Expected Duration of Therapy Session (SLP Eval)  45   -RB    Plan Comments  continue POC   -RB      User Key  (r) = Recorded By, (t) = Taken By, (c) = Cosigned By    Initials Name Provider Type    RB Darlyn Bull, SLP Speech and Language Pathologist                  Darlyn Bull MA CCC-SLP  1/7/2021

## 2021-01-08 NOTE — PROGRESS NOTES
Occupational Therapy Daily Progress Note  Visit: 14  Date of Initial Visit: Type: THERAPY  Noted: 9/3/2020      Patient: Maury Mei   : 1960  Diagnosis/ICD-10 Code:  Impaired mobility and ADLs [Z74.09, Z78.9]  Referring practitioner: Roxana Taylor DO  Date of Initial Visit: Type: THERAPY  Noted: 9/3/2020  Today's Date: 2021  Patient seen for 14 sessions      Subjective:   Patient reports: At end of session by stated noting L shoulder pain when working out at gym and during last exercise during OT session. Appears to be biceps tendonitis   Pain: 3/10 L shoulder  Subjective Questionnaire: n/a  Clinical Progress: improved  Home Program Compliance: Yes  Treatment has included: therapeutic exercise, neuromuscular re-education and therapeutic activity    Subjective   Objective     OT Neuro         OT Exercises     Row Name 21 1600             Exercise 1    Exercise Name 1  neural priming elliptical   -ST      Time (Minutes) 1  6  -ST         Exercise 2    Exercise Name 2  modified plank EOM w/cog load one-handed while other UE performing med ball shoulder abd/adduction roll-outs; ALT R/L   -ST         Exercise 3    Exercise Name 3  side plank off EOM single UE w/cog load; mod cuing for maintaining correct stability   -ST         Exercise 4    Exercise Name 4  door stretch w/scap retraction  -ST         Exercise 5    Exercise Name 5  standing on flat portion of 1/2 FR while performing cog load and varying UE mvmts w/5# tbar  -ST         Exercise 6    Exercise Name 6  stepping laterally R/L on dome 1/2 FR OH press w/10# med ball  -ST         Exercise 7    Exercise Name 7  ER rotation B UE's, palm up  -ST         Exercise 8    Exercise Name 8  shoulder flexion LUE, palm up w/downward motion  -ST        User Key  (r) = Recorded By, (t) = Taken By, (c) = Cosigned By    Initials Name Provider Type    Velvet Velasco, OTR Occupational Therapist           Assessment & Plan     Assessment  Assessment  details: Pt performed varying multi-step activities including balance, cog load and 3-4 sequenced movements for UE strengthening. Pt w/90% accuracy maintaining sequencing order but noted more difficulty when increasing complexity of balance tasks. Pt with improved ability to maintain dynamic balance on varying surface including flat portion of 1/2 FR and lateral turns. Pt also improving with maintaining scapular positioning and UE motion during progressive strengthening. Pt however c/o bicep tendon discomfort at end of session that started during work-outs at gym. Shoulder ABD/ADDUCTION recreated discomfort in clinic.         Visit Diagnoses:    ICD-10-CM ICD-9-CM   1. Impaired mobility and ADLs  Z74.09 V49.89    Z78.9    2. Decreased coordination  R27.8 781.3             Timed:  Manual Therapy:    0     mins  89881;  Therapeutic Exercise:    15     mins  93583;     Neuromuscular Diomedes:    15    mins  06650;    Therapeutic Activity:     15     mins  28322;     Self-Care/ADL     0     mins  81314;   Sensory Int. Tech      0     mins 84548;  Ultrasound:     0     mins  48940;    Electrical Stimulation:    0     mins  06796 ( );    Untimed:  Electrical Stimulation:    0     mins  41347 ( );    Timed Treatment:   30   mins   Total Treatment:     45   mins    OT Signature: Velvet Hanson MS, OTR/L, CDP  [place] License #: [id number]

## 2021-01-19 ENCOUNTER — TREATMENT (OUTPATIENT)
Dept: PHYSICAL THERAPY | Facility: CLINIC | Age: 61
End: 2021-01-19

## 2021-01-19 DIAGNOSIS — I63.9 CEREBROVASCULAR ACCIDENT (CVA), UNSPECIFIED MECHANISM (HCC): Primary | ICD-10-CM

## 2021-01-19 DIAGNOSIS — Z74.09 IMPAIRED FUNCTIONAL MOBILITY, BALANCE, GAIT, AND ENDURANCE: ICD-10-CM

## 2021-01-19 PROCEDURE — 97110 THERAPEUTIC EXERCISES: CPT | Performed by: PHYSICAL THERAPIST

## 2021-01-19 NOTE — PROGRESS NOTES
Physical Therapy Discharge Summary   Visit: 17  Date of Initial Visit: Type: THERAPY  Noted: 2020    Patient: Maury Mei   : 1960  Diagnosis/ICD-10 Code:  Cerebrovascular accident (CVA), unspecified mechanism (CMS/HCC) [I63.9]  Referring practitioner: No Known Provider  Date of Initial Visit: Type: THERAPY  Noted: 2020  Today's Date: 2021  Patient seen for 17 sessions      Subjective:   Patient reports: he feels fine since he has started to work.   Pain: 0/10  Clinical Progress: improved  Home Program Compliance: Yes  Treatment has included: therapeutic exercise    Objective   See Exercise, Manual, and Modality Logs for complete treatment.    PT Neuro          Assessment & Plan     Assessment  Assessment details: Patient has met all goals and feels he has no deficits at this time. He has returned to work and is able to perform all activities at this time. He will be discharged from PT services at this time.     Goals  Plan Goals: STG (6 visits)  1. Patient will report compliance with initial HEP. MET  2. Patient to perform TUG within 9 sec without LOB for improved functional mobility.MET  3. Patient to ambulate 10 meters without AD within 9 sec without LOB for improved       gait renard and functional mobility. MET  4. Patient to improve FGA score to >/= 27 /30 to decrease client's risk of falls. MET  5. Patient to improve mini-BEST test balance score to >/= 25/28 to decrease risk of falls. MET    LTG (12 visits)  1. Patient will be I with final HEP. MET  2. Patient to perform TUG within 8 sec without LOB for improved functional mobility. MET  3. Patient to ambulate 10 meters without AD within 8 sec without LOB for improved gait renard and functional mobility.MET  4. Patient to improve FGA score to >/= 30/30 to decrease client's risk of falls.MET  5. Patient to improve mini-BEST test balance score to >/= 28/28 to decrease client's risk of falls. MET        Plan  Plan details: Patient will  be discharged at this time.         Timed:  Manual Therapy:            0     mins  53101;  Therapeutic Exercise:    40    mins  46103;     Neuromuscular Diomedes:    0    mins  46497;    Therapeutic Activity:      0     mins  16700;     Gait Trainin    mins  81562;     Electrical Stimulation:    0    mins  76949 ( );     Untimed:  Canalith Repositioning techniques _0_ 00235      Timed Treatment:   40   mins   Total Treatment:     40   mins      Pauly Huff PT, DPT, MSCS, CDP  [place] License #: [id number]  Physical Therapist

## 2021-01-21 ENCOUNTER — TREATMENT (OUTPATIENT)
Dept: PHYSICAL THERAPY | Facility: CLINIC | Age: 61
End: 2021-01-21

## 2021-01-21 DIAGNOSIS — Z78.9 IMPAIRED MOBILITY AND ADLS: ICD-10-CM

## 2021-01-21 DIAGNOSIS — Z74.09 IMPAIRED MOBILITY AND ADLS: ICD-10-CM

## 2021-01-21 DIAGNOSIS — R27.8 DECREASED COORDINATION: Primary | ICD-10-CM

## 2021-01-21 DIAGNOSIS — R41.841 COGNITIVE COMMUNICATION DEFICIT: Primary | ICD-10-CM

## 2021-01-21 PROCEDURE — 92507 TX SP LANG VOICE COMM INDIV: CPT | Performed by: SPEECH-LANGUAGE PATHOLOGIST

## 2021-01-21 PROCEDURE — 97530 THERAPEUTIC ACTIVITIES: CPT | Performed by: OCCUPATIONAL THERAPIST

## 2021-01-21 PROCEDURE — 97112 NEUROMUSCULAR REEDUCATION: CPT | Performed by: OCCUPATIONAL THERAPIST

## 2021-01-21 PROCEDURE — 97110 THERAPEUTIC EXERCISES: CPT | Performed by: OCCUPATIONAL THERAPIST

## 2021-01-21 NOTE — PROGRESS NOTES
Occupational Therapy Daily Progress Note  Visit: 15  Date of Initial Visit: Type: THERAPY  Noted: 9/3/2020      Patient: Maury Mei   : 1960  Diagnosis/ICD-10 Code:  Decreased coordination [R27.8]  Referring practitioner: Roxana Taylor DO  Date of Initial Visit: Type: THERAPY  Noted: 9/3/2020  Today's Date: 2021  Patient seen for 15 sessions      Subjective:   Patient reports: no complaints; reports all previous discomfort has stopped since ceasing use of chest press machine at gym  Pain: 0/10  Subjective Questionnaire: n/a  Clinical Progress: improved  Home Program Compliance: Yes  Treatment has included: therapeutic exercise and therapeutic activity    Subjective   Objective     OT Neuro         OT Exercises     Row Name 21 1300             Precautions    Existing Precautions/Restrictions  fall  -ST         Exercise 1    Exercise Name 1  neural priming elliptical   -ST      Time (Minutes) 1  6  -ST         Exercise 2    Exercise Name 2  chest press on incline with dumb bells  -ST      Equipment 2  [place]  -ST      Weights/Plates 2  3  -ST      Sets 2  2  -ST      Reps 2  10  -ST         Exercise 3    Exercise Name 3  tricep dip on incline  -ST      Equipment 3  [place]  -ST      Weights/Plates 3  3  -ST      Sets 3  2  -ST      Reps 3  10  -ST         Exercise 4    Exercise Name 4  walking, toss/catch w/chest pass w/cog load-recalling varying items from ST and LT memory  -ST         Exercise 5    Exercise Name 5  walking, toss/catch, floor to chest pass while performing cog load  -ST         Exercise 6    Exercise Name 6  lateral side steps along foam balance beam performing OH press w/4# med ball  -ST      Sets 6  2  -ST      Reps 6  5  -ST         Exercise 7    Exercise Name 7  grape vine on foam balance beam performing bicep curls 4# med ball   -ST      Sets 7  2  -ST      Reps 7  5  -ST        User Key  (r) = Recorded By, (t) = Taken By, (c) = Cosigned By    Initials Name Provider  Type    Velvet Velasco, OTR Occupational Therapist           Assessment & Plan     Assessment  Impairments: abnormal coordination, activity intolerance, impaired balance, impaired physical strength and lacks appropriate home exercise program  Assessment details: Pt has met all goals and achieved max potential and perofrmance w/skilled OT services at this time. Pt is returning to work where he feels no difficulty w/LUE strength or coordination. Pt is working out regularly at gym but has required modifications of exercises he has been performing d/t certain machines causing neck/shoulder discomfort. Pt educated on performing similar exercises with free wts or bars in safe position and manner. Pt continues to have mild difficulty coordinating multi step exercises with correct positions. Recommended that pt seek assist from  if needed to avoid injury at gym. Pt continues to also perform band and stretching exercises issued in therapy.   Prognosis: good  Functional Limitations: carrying objects, lifting, walking, pulling, pushing, standing, stooping, reaching overhead and unable to perform repetitive tasks  Goals  Plan Goals:   Pt will score 26 seconds or less L HAND on the 9HPT to demonstrate improved accuracy and speed with fine motor coordination by 8 wks. MET       Pt will complete dynamic standing activity X 10 mins with independent support while incorporating BUE's to simulate ADL/IADL tasks by 8 wks. MET     Pt will be independent with L hand strengthening HEP to increase independence with ADL/IADL performance tasks by 4 wks. MET    Pt will be independent with L hand FMC HEP to increase independence with ADL/IADL performance tasks by 4 wks. MET    Pt will be independent with LUE strengthening HEP to aid with return to work/IADL function by d/c. MET        Plan  Planned therapy interventions: ADL retraining, balance/weight-bearing training, fine motor coordination training, flexibility,  functional ROM exercises, home exercise program, IADL retraining, motor coordination training, neuromuscular re-education, postural training, strengthening, stretching, therapeutic activities and transfer training  Frequency: 1x week  Duration in weeks: 4  Treatment plan discussed with: patient  Plan details: D/C d/t meeting all goals and achieving max level of performance at this time.         Visit Diagnoses:    ICD-10-CM ICD-9-CM   1. Decreased coordination  R27.8 781.3   2. Impaired mobility and ADLs  Z74.09 V49.89    Z78.9              Timed:  Manual Therapy:    0     mins  55481;  Therapeutic Exercise:    15     mins  58247;     Neuromuscular Diomedes:    15    mins  39679;    Therapeutic Activity:     15     mins  63455;     Self-Care/ADL     0     mins  97211;   Sensory Int. Tech      0     mins 35134;  Ultrasound:     0     mins  15628;    Electrical Stimulation:    0     mins  06397 ( );    Untimed:  Electrical Stimulation:    0     mins  19534 ( );    Timed Treatment:   45   mins   Total Treatment:     45   mins    OT Signature: Velvet Hanson MS, OTR/L, CDP  [place] License #: [id number]

## 2021-01-21 NOTE — PROGRESS NOTES
"Outpatient Speech Language Pathology   Adult Speech Language Cognitive Treatment Note       Patient Name: Maury Mei  : 1960  MRN: [id number]  Today's Date: 2021         Visit Date: 2021   There is no problem list on file for this patient.         Visit Dx:    ICD-10-CM ICD-9-CM   1. Cognitive communication deficit  R41.841 799.52     Pain: 0  Subjective: Back at work. Reports it is going \"well\"   LTGs  Pt will be able to safely participate and attend to stimuli in all settings with 90% accuracy and no cues.   -Pt able to attend and utilize strategies throughout tasks at 90% with no cues MET   Pt will be able to use high level cognitive skills to allow patient to return to work with 90% accuracy and no cues.    -Targeted high level attention and multi tasking tasks and went over strategies for return to work. Able to utilize strategies at 90% with no cues   MET  STGs  Pt will improve attention skills by sustaining focus to selective target/task when presented with competing stimuli or in a distracting environment in order to complete a task with 90% accuracy and minimal cues.   -Utilized distractions (noises, conversation, work related recall, etc): 90-95%.  variety of distractions presented: (crowd noise, alternating tasks, performing ST tasks) MET   Pt will improve attention skills by alternating or shifting focus between two different tasks in order to complete both tasks with 90% accuracy and minimal cues.   -Modeled and instructed on alternating focus strategies. MET at 95% with no cues   Pt will improve attention skills by dividing focus and responding simultaneously to multiple tasks or in order to complete task with 90% accuracy and minimal cues.   Pt will improve executive functioning skills by using self-monitoring strategies during functional tasks with 90% accuracy and no cues.    -Pt able to divide focus 90-95% of the time with no cues needed MET   Recertification: 2021 "             OP SLP Education     Row Name 21 1400       Education    Barriers to Learning  No barriers identified  -RB    Education Provided  Patient demonstrated recommended strategies  -RB    Assessed  Learning needs;Learning motivation;Learning preferences;Learning readiness  -RB    Learning Motivation  Strong  -RB    Learning Method  Explanation;Demonstration  -RB    Teaching Response  Verbalized understanding;Demonstrated understanding  -RB    Education Comments  discharge education   -RB      User Key  (r) = Recorded By, (t) = Taken By, (c) = Cosigned By    Initials Name Effective Dates    Darlyn Kinsey SLP 20 -           OP SLP Assessment/Plan - 21 1400        SLP Assessment    Clinical Impression Comments  Pt has returned to work, has met all goals, independent with strategies    -RB       SLP Plan    Plan Comments  discharge    -RB      User Key  (r) = Recorded By, (t) = Taken By, (c) = Cosigned By    Initials Name Provider Type    Darlyn Kinsey SLP Speech and Language Pathologist          Speech Language Pathology Discharge Summary         Patient Name: Maury Mei  : 1960  MRN: [id number]    Today's Date: 2021          OP SLP Discharge Summary  Date of Discharge: 21  Reason for Discharge: all goals and outcomes met, no further needs identified  Progress Toward Achieving Short/long Term Goals: all goals met within established timelines  Discharge Destination: home  Discharge Instructions: f/u as needed        RAMAN Price  2021